# Patient Record
Sex: MALE | Race: WHITE | Employment: PART TIME | URBAN - METROPOLITAN AREA
[De-identification: names, ages, dates, MRNs, and addresses within clinical notes are randomized per-mention and may not be internally consistent; named-entity substitution may affect disease eponyms.]

---

## 2017-01-05 ENCOUNTER — OFFICE VISIT (OUTPATIENT)
Dept: FAMILY MEDICINE CLINIC | Age: 22
End: 2017-01-05

## 2017-01-05 VITALS
DIASTOLIC BLOOD PRESSURE: 82 MMHG | HEART RATE: 88 BPM | SYSTOLIC BLOOD PRESSURE: 138 MMHG | TEMPERATURE: 97.4 F | WEIGHT: 218 LBS | RESPIRATION RATE: 20 BRPM | HEIGHT: 69 IN | BODY MASS INDEX: 32.29 KG/M2

## 2017-01-05 DIAGNOSIS — R09.89 LYMPH NODE SYMPTOM: Primary | ICD-10-CM

## 2017-01-05 PROCEDURE — 99213 OFFICE O/P EST LOW 20 MIN: CPT | Performed by: FAMILY MEDICINE

## 2017-01-05 ASSESSMENT — PATIENT HEALTH QUESTIONNAIRE - PHQ9
1. LITTLE INTEREST OR PLEASURE IN DOING THINGS: 0
2. FEELING DOWN, DEPRESSED OR HOPELESS: 0
SUM OF ALL RESPONSES TO PHQ QUESTIONS 1-9: 0
SUM OF ALL RESPONSES TO PHQ9 QUESTIONS 1 & 2: 0

## 2017-01-05 ASSESSMENT — ENCOUNTER SYMPTOMS
SINUS PRESSURE: 0
COUGH: 0
ABDOMINAL PAIN: 0
SHORTNESS OF BREATH: 0
CONSTIPATION: 0
DIARRHEA: 0
EYE ITCHING: 0
EYE DISCHARGE: 0
SORE THROAT: 0

## 2017-08-08 ENCOUNTER — OFFICE VISIT (OUTPATIENT)
Dept: FAMILY MEDICINE CLINIC | Age: 22
End: 2017-08-08

## 2017-08-08 VITALS
WEIGHT: 209 LBS | HEART RATE: 88 BPM | RESPIRATION RATE: 18 BRPM | BODY MASS INDEX: 30.96 KG/M2 | TEMPERATURE: 97.9 F | HEIGHT: 69 IN | DIASTOLIC BLOOD PRESSURE: 76 MMHG | SYSTOLIC BLOOD PRESSURE: 122 MMHG

## 2017-08-08 DIAGNOSIS — Z01.89 ROUTINE LAB DRAW: Primary | ICD-10-CM

## 2017-08-08 PROCEDURE — 99395 PREV VISIT EST AGE 18-39: CPT | Performed by: FAMILY MEDICINE

## 2017-08-08 ASSESSMENT — ENCOUNTER SYMPTOMS
SHORTNESS OF BREATH: 0
DIARRHEA: 0
WHEEZING: 0
RHINORRHEA: 0
SINUS PRESSURE: 0
CONSTIPATION: 0
COUGH: 0
SORE THROAT: 1
BACK PAIN: 0
CHEST TIGHTNESS: 0
ABDOMINAL PAIN: 0

## 2019-03-19 ENCOUNTER — TELEPHONE (OUTPATIENT)
Dept: OTHER | Facility: CLINIC | Age: 24
End: 2019-03-19

## 2024-03-06 ASSESSMENT — PROMIS GLOBAL HEALTH SCALE
RATE_MENTAL_HEALTH: VERY GOOD
RATE_QUALITY_OF_LIFE: VERY GOOD
RATE_PHYSICAL_HEALTH: EXCELLENT
RATE_AVERAGE_PAIN: 2
CARRYOUT_PHYSICAL_ACTIVITIES: COMPLETELY
EMOTIONAL_PROBLEMS: SOMETIMES
RATE_GENERAL_HEALTH: VERY GOOD
CARRYOUT_SOCIAL_ACTIVITIES: VERY GOOD
RATE_AVERAGE_FATIGUE: MILD
RATE_SOCIAL_SATISFACTION: VERY GOOD

## 2024-03-06 NOTE — PROGRESS NOTES
"Subjective   Patient ID: Dutch Richardson is a 28 y.o. male who presents for testicular discomfort, Annual Exam, and Facial Swelling.  HPI    Patient presents in office today for an Annual physical. Last eye exam June 2022, last dental exam 2023. No new family h/o of cancer or heart disease. Does not need paperwork filled out today.     Testicular discomfort   States 15 - 16 years ago testicular torsions  Had surgery to correct this   Right side testicular swelling   Admits right has always been bigger than the left   Admits to being uncomfortable constantly   Current episode ongoing one week   Denies pain during sexual intercourse     Right sided facial swelling   Ongoing x 3 weeks   \"Weird feeling\" right side of tongue   Feels better today     Review of Systems  Constitutional:  no chills, no fever and no night sweats.  Eyes: no blurred vision and no eyesight problems.  ENT: no hearing loss, no nasal congestion, no hoarseness and no sore throat.  Neck: no mass (es) and no swelling.  Cardiovascular: no chest pain, no intermittent leg claudication, no lower extremity edema, no palpitation and no syncope.  Respiratory: no cough, no shortness of breath during exertion, no shortness of breath at rest and no wheezing.  Gastrointestinal: no abdominal pain, no blood in stools, no constipation, no diarrhea, no melena, no nausea, no rectal pain and no vomiting.  Genitourinary: no dysuria, no change in urinary frequency, no urinary hesitancy and no feelings of urinary urgency.  Musculoskeletal: no arthralgias, no back pain and no myalgias.  Integumentary: no new skin lesions and no rashes.  Neurological: no difficulty walking, no headache, no limb weakness, no numbness and no tingling.  Psychiatric/Behavioral: no anxiety, no depression, no anhedonia and no substance use disorders.  Endocrine: no recent weight gain and no recent weight loss.  Hematologic/Lymphatic: no tendency for easy bruising and no swollen glands    Objective " "  Physical Exam  Patient in for annual exam complaints of some right-sided fullness and throat feels like there is something swollen did not hurt to swallow no fever no chills.  Also right-sided testicular pain which is going on for about 2 to 3 weeks.  No trauma he is a  states that about age 11 he had testicular torsion and on the right side and had surgery bilaterally to fix that he has not had a problem since.  No increased physical activity as far as lifting bending he does workout and lift weights on a regular basis but he is not changed his workouts.  Well-developed well-nourished muscular male in no acute distress he has mild enlargement of the tonsil on the right side there is no adenopathy no sinus tenderness neck is supple lungs are clear no wheeze rhonchi crackles or retractions cardiac and abdominal exams benign he has no inguinal adenopathy he has left testicle is normal right testicle is fine no mass nontender right epididymis is swollen and very tender with mild palpation.  He has no musculoskeletal complaints no peripheral edema.  /56   Pulse 80   Temp 35.8 °C (96.5 °F) (Temporal)   Resp 18   Ht 1.727 m (5' 8\")   Wt 90.3 kg (199 lb)   SpO2 97%   BMI 30.26 kg/m²     No results found for: \"WBC\", \"HGB\", \"HCT\", \"MCV\", \"PLT\"    Assessment/Plan assessments epididymitis possible tonsillitis plan is to put him on Cipro 500 twice daily for a month he will follow-up with me in 2 weeks and will get blood drawn in the next few days we will follow-up when we have those results.  If the epididymis does not settle down or new symptoms develop we may need to get ultrasound of the scrotum or refer to urology for further workup depending on how he does over the next 2 weeks.  Problem List Items Addressed This Visit    None  Visit Diagnoses       Testicular discomfort    -  Primary    Relevant Medications    ciprofloxacin (Cipro) 500 mg tablet    Healthcare maintenance        Relevant Orders "    CBC and Auto Differential    Comprehensive Metabolic Panel    Lipid Panel    Urinalysis with Reflex Microscopic    Annual physical exam        Facial swelling        Epididymitis        Relevant Medications    ciprofloxacin (Cipro) 500 mg tablet

## 2024-03-07 ENCOUNTER — OFFICE VISIT (OUTPATIENT)
Dept: PRIMARY CARE | Facility: CLINIC | Age: 29
End: 2024-03-07
Payer: COMMERCIAL

## 2024-03-07 VITALS
OXYGEN SATURATION: 97 % | RESPIRATION RATE: 18 BRPM | TEMPERATURE: 96.5 F | HEIGHT: 68 IN | HEART RATE: 80 BPM | WEIGHT: 199 LBS | DIASTOLIC BLOOD PRESSURE: 56 MMHG | SYSTOLIC BLOOD PRESSURE: 112 MMHG | BODY MASS INDEX: 30.16 KG/M2

## 2024-03-07 DIAGNOSIS — Z00.00 HEALTHCARE MAINTENANCE: ICD-10-CM

## 2024-03-07 DIAGNOSIS — Z00.00 ANNUAL PHYSICAL EXAM: ICD-10-CM

## 2024-03-07 DIAGNOSIS — N45.1 EPIDIDYMITIS: ICD-10-CM

## 2024-03-07 DIAGNOSIS — N50.819 TESTICULAR DISCOMFORT: Primary | ICD-10-CM

## 2024-03-07 DIAGNOSIS — R22.0 FACIAL SWELLING: ICD-10-CM

## 2024-03-07 PROCEDURE — 99395 PREV VISIT EST AGE 18-39: CPT | Performed by: FAMILY MEDICINE

## 2024-03-07 RX ORDER — CIPROFLOXACIN 500 MG/1
500 TABLET ORAL 2 TIMES DAILY
Qty: 60 TABLET | Refills: 1 | Status: SHIPPED | OUTPATIENT
Start: 2024-03-07 | End: 2024-04-06

## 2024-03-08 ENCOUNTER — TELEPHONE (OUTPATIENT)
Dept: PRIMARY CARE | Facility: CLINIC | Age: 29
End: 2024-03-08

## 2024-03-08 ENCOUNTER — LAB (OUTPATIENT)
Dept: LAB | Facility: LAB | Age: 29
End: 2024-03-08
Payer: COMMERCIAL

## 2024-03-08 DIAGNOSIS — Z00.00 HEALTHCARE MAINTENANCE: ICD-10-CM

## 2024-03-08 LAB
ALBUMIN SERPL BCP-MCNC: 4 G/DL (ref 3.4–5)
ALP SERPL-CCNC: 60 U/L (ref 33–120)
ALT SERPL W P-5'-P-CCNC: 17 U/L (ref 10–52)
ANION GAP SERPL CALC-SCNC: 10 MMOL/L (ref 10–20)
APPEARANCE UR: CLEAR
AST SERPL W P-5'-P-CCNC: 14 U/L (ref 9–39)
BASOPHILS # BLD AUTO: 0.08 X10*3/UL (ref 0–0.1)
BASOPHILS NFR BLD AUTO: 0.8 %
BILIRUB SERPL-MCNC: 0.9 MG/DL (ref 0–1.2)
BILIRUB UR STRIP.AUTO-MCNC: NEGATIVE MG/DL
BUN SERPL-MCNC: 19 MG/DL (ref 6–23)
CALCIUM SERPL-MCNC: 9.2 MG/DL (ref 8.6–10.3)
CHLORIDE SERPL-SCNC: 104 MMOL/L (ref 98–107)
CHOLEST SERPL-MCNC: 135 MG/DL (ref 0–199)
CHOLESTEROL/HDL RATIO: 2.9
CO2 SERPL-SCNC: 28 MMOL/L (ref 21–32)
COLOR UR: YELLOW
CREAT SERPL-MCNC: 0.89 MG/DL (ref 0.5–1.3)
EGFRCR SERPLBLD CKD-EPI 2021: >90 ML/MIN/1.73M*2
EOSINOPHIL # BLD AUTO: 0.42 X10*3/UL (ref 0–0.7)
EOSINOPHIL NFR BLD AUTO: 4.4 %
ERYTHROCYTE [DISTWIDTH] IN BLOOD BY AUTOMATED COUNT: 12.7 % (ref 11.5–14.5)
GLUCOSE SERPL-MCNC: 89 MG/DL (ref 74–99)
GLUCOSE UR STRIP.AUTO-MCNC: NEGATIVE MG/DL
HCT VFR BLD AUTO: 42.7 % (ref 41–52)
HDLC SERPL-MCNC: 46.1 MG/DL
HGB BLD-MCNC: 14.7 G/DL (ref 13.5–17.5)
IMM GRANULOCYTES # BLD AUTO: 0.02 X10*3/UL (ref 0–0.7)
IMM GRANULOCYTES NFR BLD AUTO: 0.2 % (ref 0–0.9)
KETONES UR STRIP.AUTO-MCNC: NEGATIVE MG/DL
LDLC SERPL CALC-MCNC: 83 MG/DL
LEUKOCYTE ESTERASE UR QL STRIP.AUTO: NEGATIVE
LYMPHOCYTES # BLD AUTO: 4.82 X10*3/UL (ref 1.2–4.8)
LYMPHOCYTES NFR BLD AUTO: 50.2 %
MCH RBC QN AUTO: 30.2 PG (ref 26–34)
MCHC RBC AUTO-ENTMCNC: 34.4 G/DL (ref 32–36)
MCV RBC AUTO: 88 FL (ref 80–100)
MONOCYTES # BLD AUTO: 0.91 X10*3/UL (ref 0.1–1)
MONOCYTES NFR BLD AUTO: 9.5 %
NEUTROPHILS # BLD AUTO: 3.35 X10*3/UL (ref 1.2–7.7)
NEUTROPHILS NFR BLD AUTO: 34.9 %
NITRITE UR QL STRIP.AUTO: NEGATIVE
NON HDL CHOLESTEROL: 89 MG/DL (ref 0–149)
NRBC BLD-RTO: 0 /100 WBCS (ref 0–0)
PH UR STRIP.AUTO: 6 [PH]
PLATELET # BLD AUTO: 247 X10*3/UL (ref 150–450)
POTASSIUM SERPL-SCNC: 4.4 MMOL/L (ref 3.5–5.3)
PROT SERPL-MCNC: 6.5 G/DL (ref 6.4–8.2)
PROT UR STRIP.AUTO-MCNC: NEGATIVE MG/DL
RBC # BLD AUTO: 4.86 X10*6/UL (ref 4.5–5.9)
RBC # UR STRIP.AUTO: NEGATIVE /UL
SODIUM SERPL-SCNC: 138 MMOL/L (ref 136–145)
SP GR UR STRIP.AUTO: 1.02
TRIGL SERPL-MCNC: 32 MG/DL (ref 0–149)
UROBILINOGEN UR STRIP.AUTO-MCNC: <2 MG/DL
VLDL: 6 MG/DL (ref 0–40)
WBC # BLD AUTO: 9.6 X10*3/UL (ref 4.4–11.3)

## 2024-03-08 PROCEDURE — 80053 COMPREHEN METABOLIC PANEL: CPT

## 2024-03-08 PROCEDURE — 85025 COMPLETE CBC W/AUTO DIFF WBC: CPT

## 2024-03-08 PROCEDURE — 81003 URINALYSIS AUTO W/O SCOPE: CPT

## 2024-03-08 PROCEDURE — 36415 COLL VENOUS BLD VENIPUNCTURE: CPT

## 2024-03-08 PROCEDURE — 80061 LIPID PANEL: CPT

## 2024-03-08 NOTE — TELEPHONE ENCOUNTER
----- Message from Oscar Marroquin MD sent at 3/8/2024  1:37 PM EST -----  All labs are normal.  Repeat in a year

## 2024-03-22 ENCOUNTER — TELEPHONE (OUTPATIENT)
Dept: PRIMARY CARE | Facility: CLINIC | Age: 29
End: 2024-03-22
Payer: COMMERCIAL

## 2024-03-22 NOTE — TELEPHONE ENCOUNTER
FYI MESSAGE    Patient called and says that ciprofloxacin (Cipro) 500 mg tablet has been working; his symptoms have gone done. 100% gone but he is improving

## 2024-04-02 ENCOUNTER — TELEPHONE (OUTPATIENT)
Dept: PRIMARY CARE | Facility: CLINIC | Age: 29
End: 2024-04-02
Payer: COMMERCIAL

## 2024-04-02 DIAGNOSIS — N50.819 TESTICULAR DISCOMFORT: ICD-10-CM

## 2024-04-02 NOTE — TELEPHONE ENCOUNTER
HILARIA Marroquin,      After the first two weeks I noticed improvement in the pain and discomfort. As of today, April 2, 2024, I am still experiencing some discomfort. The pain increases or decreases based on activity level, type of underwear worn, or how active I’ve been at work. It still feels pretty sensitive to the touch.      I have 4 more days of Cirpro doses left. I am wondering what you believe we should do next? Thanks.     Dutch Richardson

## 2024-04-02 NOTE — TELEPHONE ENCOUNTER
----- Message from Dutch Richardson sent at 4/2/2024 11:47 AM EDT -----  Regarding: Following up from previous visit   Contact: [phone]  Dr. Marroquin,     After the first two weeks I noticed improvement in the pain and discomfort. As of today, April 2, 2024, I am still experiencing some discomfort. The pain increases or decreases based on activity level, type of underwear worn, or how active I’ve been at work. It still feels pretty sensitive to the touch.     I have 4 more days of Cirpro doses left. I am wondering what you believe we should do next? Thanks.    Dutch Richardson

## 2024-04-26 ENCOUNTER — OFFICE VISIT (OUTPATIENT)
Dept: UROLOGY | Facility: CLINIC | Age: 29
End: 2024-04-26
Payer: COMMERCIAL

## 2024-04-26 VITALS — HEART RATE: 68 BPM | TEMPERATURE: 97.1 F | SYSTOLIC BLOOD PRESSURE: 138 MMHG | DIASTOLIC BLOOD PRESSURE: 75 MMHG

## 2024-04-26 DIAGNOSIS — N50.819 TESTICULAR DISCOMFORT: ICD-10-CM

## 2024-04-26 PROCEDURE — 99204 OFFICE O/P NEW MOD 45 MIN: CPT | Performed by: NURSE PRACTITIONER

## 2024-04-26 PROCEDURE — 1036F TOBACCO NON-USER: CPT | Performed by: NURSE PRACTITIONER

## 2024-04-26 RX ORDER — MELOXICAM 15 MG/1
15 TABLET ORAL DAILY
Qty: 30 TABLET | Refills: 1 | Status: SHIPPED | OUTPATIENT
Start: 2024-04-26 | End: 2024-06-25

## 2024-04-26 NOTE — PROGRESS NOTES
Subjective   Patient ID: Dutch Richardson is a 28 y.o. male who presents for Testicle Pain (Right Testicle ).  Presents for eval of right testicular pain which began in early March 2024. Saw Dr. Marroquin and treated with Cipro for 1 month. Felt that symptoms were better after 2 weeks. Went for a run with dramatic exacerbation of pain. Completed full course of antibiotics without any benefit from baseline. States he has been off of antibiotics since end of March.     States pain has been intermittent for the last few weeks. Typically occurs all day long. Often has pain with water during shower. Variable pain especially if its cold as well as pants.     Previously drinking 2+ gallons of water daily, has decreased intake with some benefit. Also tenderness at the base. Seen for eval with unremarkable ultrasound. Symptoms resolved.     Works as a  in [place]. Recently lost 30 lbs.     Remote history of testicular torsion with surgical intervention in 2009.     Testicle Pain  The patient's primary symptoms include testicular pain.       Review of Systems   Genitourinary:  Positive for testicular pain.       Objective   Physical Exam  Vitals reviewed.   Genitourinary:     Penis: Normal and circumcised.       Testes: Normal.      Epididymis:      Right: Tenderness present.      Left: Normal.       Constitutional: Patient generally appears stated age. Good nutrition. No deformities. Good attention to grooming.  Neck: No neck masses. Good symmetry. No crepitus. Normal thyroid size and consistency with no masses.  Respiratory: Normal respiratory effort. No intercostal retractions. No use of accessory muscles.  Cardiovascular: Examination of the peripheral vascular system reveals no swelling or varicosities with good pulses. Normal extremity temperature, no edema or tenderness.  Abdomen: Examination of the abdomen reveals no masses or tenderness. No hernias detected. Normal liver and spleen size.   Lymphatic: No  palpable lymph nodes in the neck, axilla, groin or other locations  Skin: Inspection of the skin reveals no rashes, lesions, ulcers.  Neurologic/Psychiatric: Oriented to time, place and person. Normal mood and affect with no depression, anxiety, or agitation.      Assessment/Plan     Trial meloxicam daily for 1-2 months for management. Will eval with scrotal ultrasound. Follow up in a few months.          ANNELISE Simpson-CNP 04/26/24 9:49 AM

## 2024-04-30 ENCOUNTER — HOSPITAL ENCOUNTER (OUTPATIENT)
Dept: RADIOLOGY | Facility: CLINIC | Age: 29
Discharge: HOME | End: 2024-04-30
Payer: COMMERCIAL

## 2024-04-30 DIAGNOSIS — N50.819 TESTICULAR DISCOMFORT: ICD-10-CM

## 2024-04-30 PROCEDURE — 76870 US EXAM SCROTUM: CPT | Performed by: RADIOLOGY

## 2024-04-30 PROCEDURE — 76870 US EXAM SCROTUM: CPT

## 2024-05-05 ENCOUNTER — APPOINTMENT (OUTPATIENT)
Dept: RADIOLOGY | Facility: HOSPITAL | Age: 29
End: 2024-05-05
Payer: COMMERCIAL

## 2024-05-05 ENCOUNTER — HOSPITAL ENCOUNTER (EMERGENCY)
Facility: HOSPITAL | Age: 29
Discharge: HOME | End: 2024-05-05
Attending: EMERGENCY MEDICINE
Payer: COMMERCIAL

## 2024-05-05 VITALS
HEIGHT: 68 IN | RESPIRATION RATE: 18 BRPM | DIASTOLIC BLOOD PRESSURE: 58 MMHG | BODY MASS INDEX: 31.17 KG/M2 | OXYGEN SATURATION: 98 % | WEIGHT: 205.69 LBS | SYSTOLIC BLOOD PRESSURE: 121 MMHG | HEART RATE: 68 BPM | TEMPERATURE: 97.3 F

## 2024-05-05 DIAGNOSIS — T14.8XXA MUSCLE STRAIN: Primary | ICD-10-CM

## 2024-05-05 DIAGNOSIS — M54.50 ACUTE BILATERAL LOW BACK PAIN WITHOUT SCIATICA: ICD-10-CM

## 2024-05-05 PROCEDURE — 96372 THER/PROPH/DIAG INJ SC/IM: CPT | Performed by: EMERGENCY MEDICINE

## 2024-05-05 PROCEDURE — 2500000004 HC RX 250 GENERAL PHARMACY W/ HCPCS (ALT 636 FOR OP/ED)

## 2024-05-05 PROCEDURE — 99284 EMERGENCY DEPT VISIT MOD MDM: CPT | Mod: 25

## 2024-05-05 PROCEDURE — 2500000005 HC RX 250 GENERAL PHARMACY W/O HCPCS: Performed by: EMERGENCY MEDICINE

## 2024-05-05 PROCEDURE — 99285 EMERGENCY DEPT VISIT HI MDM: CPT | Performed by: EMERGENCY MEDICINE

## 2024-05-05 PROCEDURE — 72131 CT LUMBAR SPINE W/O DYE: CPT

## 2024-05-05 PROCEDURE — 96372 THER/PROPH/DIAG INJ SC/IM: CPT

## 2024-05-05 PROCEDURE — 2500000004 HC RX 250 GENERAL PHARMACY W/ HCPCS (ALT 636 FOR OP/ED): Performed by: EMERGENCY MEDICINE

## 2024-05-05 PROCEDURE — 72131 CT LUMBAR SPINE W/O DYE: CPT | Mod: FOREIGN READ | Performed by: RADIOLOGY

## 2024-05-05 RX ORDER — CYCLOBENZAPRINE HCL 10 MG
5 TABLET ORAL 3 TIMES DAILY PRN
Qty: 6 TABLET | Refills: 0 | Status: SHIPPED | OUTPATIENT
Start: 2024-05-05 | End: 2024-05-09

## 2024-05-05 RX ORDER — HYDROMORPHONE HYDROCHLORIDE 1 MG/ML
1 INJECTION, SOLUTION INTRAMUSCULAR; INTRAVENOUS; SUBCUTANEOUS ONCE
Status: COMPLETED | OUTPATIENT
Start: 2024-05-05 | End: 2024-05-05

## 2024-05-05 RX ORDER — OXYCODONE HYDROCHLORIDE 5 MG/1
5 TABLET ORAL EVERY 6 HOURS PRN
Qty: 9 TABLET | Refills: 0 | Status: SHIPPED | OUTPATIENT
Start: 2024-05-05 | End: 2024-05-08

## 2024-05-05 RX ORDER — LIDOCAINE 560 MG/1
1 PATCH PERCUTANEOUS; TOPICAL; TRANSDERMAL DAILY
Qty: 7 PATCH | Refills: 0 | Status: SHIPPED | OUTPATIENT
Start: 2024-05-05 | End: 2024-05-12

## 2024-05-05 RX ORDER — LIDOCAINE 560 MG/1
1 PATCH PERCUTANEOUS; TOPICAL; TRANSDERMAL DAILY
Status: DISCONTINUED | OUTPATIENT
Start: 2024-05-05 | End: 2024-05-05 | Stop reason: HOSPADM

## 2024-05-05 RX ORDER — ORPHENADRINE CITRATE 30 MG/ML
60 INJECTION INTRAMUSCULAR; INTRAVENOUS ONCE
Status: COMPLETED | OUTPATIENT
Start: 2024-05-05 | End: 2024-05-05

## 2024-05-05 RX ORDER — HYDROMORPHONE HYDROCHLORIDE 1 MG/ML
INJECTION, SOLUTION INTRAMUSCULAR; INTRAVENOUS; SUBCUTANEOUS
Status: COMPLETED
Start: 2024-05-05 | End: 2024-05-05

## 2024-05-05 RX ADMIN — LIDOCAINE 4% 1 PATCH: 40 PATCH TOPICAL at 11:12

## 2024-05-05 RX ADMIN — HYDROMORPHONE HYDROCHLORIDE 1 MG: 1 INJECTION, SOLUTION INTRAMUSCULAR; INTRAVENOUS; SUBCUTANEOUS at 13:40

## 2024-05-05 RX ADMIN — ORPHENADRINE CITRATE 60 MG: 60 INJECTION INTRAMUSCULAR; INTRAVENOUS at 11:09

## 2024-05-05 RX ADMIN — HYDROMORPHONE HYDROCHLORIDE 1 MG: 1 INJECTION, SOLUTION INTRAMUSCULAR; INTRAVENOUS; SUBCUTANEOUS at 11:35

## 2024-05-05 ASSESSMENT — PAIN DESCRIPTION - ORIENTATION: ORIENTATION: LOWER

## 2024-05-05 ASSESSMENT — PAIN DESCRIPTION - PAIN TYPE: TYPE: ACUTE PAIN

## 2024-05-05 ASSESSMENT — PAIN - FUNCTIONAL ASSESSMENT
PAIN_FUNCTIONAL_ASSESSMENT: 0-10

## 2024-05-05 ASSESSMENT — PAIN DESCRIPTION - LOCATION: LOCATION: BACK

## 2024-05-05 ASSESSMENT — COLUMBIA-SUICIDE SEVERITY RATING SCALE - C-SSRS
6. HAVE YOU EVER DONE ANYTHING, STARTED TO DO ANYTHING, OR PREPARED TO DO ANYTHING TO END YOUR LIFE?: NO
1. IN THE PAST MONTH, HAVE YOU WISHED YOU WERE DEAD OR WISHED YOU COULD GO TO SLEEP AND NOT WAKE UP?: NO
2. HAVE YOU ACTUALLY HAD ANY THOUGHTS OF KILLING YOURSELF?: NO

## 2024-05-05 ASSESSMENT — LIFESTYLE VARIABLES
EVER HAD A DRINK FIRST THING IN THE MORNING TO STEADY YOUR NERVES TO GET RID OF A HANGOVER: NO
HAVE YOU EVER FELT YOU SHOULD CUT DOWN ON YOUR DRINKING: NO
HAVE PEOPLE ANNOYED YOU BY CRITICIZING YOUR DRINKING: NO
TOTAL SCORE: 0
EVER FELT BAD OR GUILTY ABOUT YOUR DRINKING: NO

## 2024-05-05 ASSESSMENT — PAIN SCALES - GENERAL
PAINLEVEL_OUTOF10: 10 - WORST POSSIBLE PAIN
PAINLEVEL_OUTOF10: 8
PAINLEVEL_OUTOF10: 7
PAINLEVEL_OUTOF10: 7
PAINLEVEL_OUTOF10: 9

## 2024-05-05 NOTE — ED PROVIDER NOTES
EMERGENCY DEPARTMENT ENCOUNTER      Pt Name: Dutch Richardson  MRN: [id number]  Birthdate 1995  Date of evaluation: 5/5/2024    HISTORY OF PRESENT ILLNESS    Dutch Richardson is an 28 y.o. male with history including Epididymitis on meloxicam presenting to the emergency department for lower back pain.  Patient states that he was doing dead lifts earlier today.  He was able to complete his workout and then developed back pain after he bent over to remove something from the bar.  Pain is on both sides of his back.  He denies any numbness in his lower extremities or saddle anesthesia.  Patient denies any tingling or loss of motor function.  Pain is worsened by certain movements and feels better when he is sitting still or not twisting.  He has not taken any medications in regards for this pain.  He has had something similar to this a few years ago but did not go to the ED and did not take any medications and it resolved on its own.    PAST MEDICAL HISTORY     Past Medical History:   Diagnosis Date    Allergic        SURGICAL HISTORY       Past Surgical History:   Procedure Laterality Date    ADENOIDECTOMY      HERNIA REPAIR         CURRENT MEDICATIONS       Previous Medications    MELOXICAM (MOBIC) 15 MG TABLET    Take 1 tablet (15 mg) by mouth once daily.       ALLERGIES     Patient has no known allergies.    FAMILY HISTORY       Family History   Problem Relation Name Age of Onset    Breast cancer Mother Mandi Richardson         SOCIAL HISTORY       Social History     Socioeconomic History    Marital status:      Spouse name: None    Number of children: None    Years of education: None    Highest education level: None   Occupational History    None   Tobacco Use    Smoking status: Never    Smokeless tobacco: Former     Types: Snuff     Quit date: 7/2/2020   Substance and Sexual Activity    Alcohol use: Never    Drug use: Never    Sexual activity: Yes     Partners: Female     Birth control/protection: I.U.D.     Comment: Wife    Other Topics Concern    None   Social History Narrative    None     Social Determinants of Health     Financial Resource Strain: Low Risk  (12/15/2023)    Received from [employer]    Overall Financial Resource Strain (CARDIA)     Difficulty of Paying Living Expenses: Not very hard   Food Insecurity: No Food Insecurity (12/15/2023)    Received from [employer]    Hunger Vital Sign     Worried About Running Out of Food in the Last Year: Never true     Ran Out of Food in the Last Year: Never true   Transportation Needs: No Transportation Needs (12/15/2023)    Received from [employer]    PRAPARE - Transportation     Lack of Transportation (Medical): No     Lack of Transportation (Non-Medical): No   Physical Activity: Sufficiently Active (12/15/2023)    Received from [employer]    Exercise Vital Sign     Days of Exercise per Week: 7 days     Minutes of Exercise per Session: 60 min   Stress: No Stress Concern Present (12/15/2023)    Received from [employer]    [place] [employer] - Occupational Stress Questionnaire     Feeling of Stress : Not at all   Social Connections: Moderately Isolated (12/15/2023)    Received from [employer]    Social Connection and Isolation Panel [NHANES]     Frequency of Communication with Friends and Family: Twice a week     Frequency of Social Gatherings with Friends and Family: Once a week     Attends Buddhist Services: Never     Active Member of Clubs or Organizations: No     Attends Club or Organization Meetings: Never     Marital Status:    Intimate Partner Violence: Not At Risk (12/15/2023)    Received from [employer]    Humiliation, Afraid, Rape, and Kick questionnaire     Fear of Current or Ex-Partner: No     Emotionally Abused: No     Physically Abused: No     Sexually Abused: No   Housing Stability: Low Risk  (12/15/2023)    Received from [employer]    Housing Stability Vital Sign     Unable to Pay for Housing in the Last Year: No     Number of  Places Lived in the Last Year: 1     Unstable Housing in the Last Year: No       PHYSICAL EXAM       ED Triage Vitals [05/05/24 1002]   Temperature Heart Rate Respirations BP   36.1 °C (97 °F) 72 16 134/76      Pulse Ox Temp Source Heart Rate Source Patient Position   99 % Temporal Monitor Sitting      BP Location FiO2 (%)     Right arm --       Physical Exam  Vitals and nursing note reviewed.   Constitutional:       General: He is not in acute distress.     Appearance: He is well-developed.   HENT:      Head: Normocephalic and atraumatic.   Eyes:      Conjunctiva/sclera: Conjunctivae normal.   Cardiovascular:      Rate and Rhythm: Normal rate and regular rhythm.      Heart sounds: No murmur heard.  Pulmonary:      Effort: Pulmonary effort is normal. No respiratory distress.      Breath sounds: Normal breath sounds.   Musculoskeletal:      Cervical back: Normal and neck supple.      Thoracic back: Normal.      Lumbar back: No tenderness or bony tenderness. Decreased range of motion (Pain with movement). Negative right straight leg raise test and negative left straight leg raise test. No scoliosis.   Skin:     General: Skin is warm and dry.      Capillary Refill: Capillary refill takes less than 2 seconds.   Neurological:      Mental Status: He is alert.   Psychiatric:         Mood and Affect: Mood normal.          DIAGNOSTIC RESULTS     LABS:  Labs Reviewed - No data to display    All other labs were within normal range or not returned as of this dictation.    Imaging  CT lumbar spine wo IV contrast   Final Result   No acute fracture or traumatic subluxation.   Signed by Dave Obrien MD           Procedures  Procedures     EMERGENCY DEPARTMENT COURSE/MDM:   Medical Decision Making  Dutch Richardson is an 28 y.o. male with history including Epididymitis on meloxicam presenting to the emergency department for lower back pain.  Patient has no red flag symptoms.  Pain is reproducible with movement.  Most likely a muscle  strain or SI joint irritation from the bending and twisting motion.  Patient given a dose of Norflex IM here and his father will drive him home.  He was given a short prescription of muscle relaxants for pain relief as patient already takes meloxicam no need for additional NSAIDs.  Patient was given strict return precautions and what to look for for red flag symptoms.    At time of discharge patient had severe pain not improved with Norflex given IM 1 mg Dilaudid. he was still having severe pain and could not move CT imaging was at this time.  Reviewed the CT imaging shows no signs of subluxation or traumatic injury.  Patient was given a second dose of Dilaudid while waiting for imaging.  He will be discharged adding on pain medication with muscle relaxants and lidocaine patches.        Diagnoses as of 05/05/24 1424   Muscle strain   Acute bilateral low back pain without sciatica        External records reviewed: recent inpatient, clinic, and prior ED notes  Labs and Diagnostic imaging independently reviewed/interpreted by me.    Patient plan, care, lab results and imaging were all discussed with attending.    ED Medications administered this visit:    Medications   lidocaine 4 % patch 1 patch (1 patch transdermal Medication Applied 5/5/24 1112)   orphenadrine (Norflex) injection 60 mg (60 mg intramuscular Given 5/5/24 1109)   HYDROmorphone (Dilaudid) injection 1 mg (1 mg intramuscular Given 5/5/24 1135)   HYDROmorphone (Dilaudid) injection 1 mg (1 mg intramuscular Given 5/5/24 1340)     New Prescriptions from this visit:    New Prescriptions    CYCLOBENZAPRINE (FLEXERIL) 10 MG TABLET    Take 0.5 tablets (5 mg) by mouth 3 times a day as needed for muscle spasms for up to 4 days.       (Please note that portions of this note were completed with a voice recognition program.  Efforts were made to edit the dictations but occasionally words are mis-transcribed.)     Cori Gilman,   Resident  05/05/24 1111        Cori Gilman,   Resident  05/05/24 1203       DO Erasto Garnica  05/05/24 4717

## 2024-05-05 NOTE — DISCHARGE INSTRUCTIONS
Seek immediate medical attention if you develop: increasing pain, numbness, tingling, weakness, loss of motion in your arms or legs, loss of control of your urine or stool, fever, abdominal pain, chest pain, shortness of breath, or any new or worsening symptoms.

## 2024-05-05 NOTE — Clinical Note
Dutch Richardson was seen and treated in our emergency department on 5/5/2024.  He may return to work on 05/09/2024.       If you have any questions or concerns, please don't hesitate to call.      Justen Blackwell, DO

## 2024-05-08 ASSESSMENT — ENCOUNTER SYMPTOMS
PERIANAL NUMBNESS: 0
LEG PAIN: 0
FEVER: 0
DYSURIA: 0
BOWEL INCONTINENCE: 0
ABDOMINAL PAIN: 0
PARESTHESIAS: 0
WEIGHT LOSS: 0
BACK PAIN: 1
PARESIS: 0
WEAKNESS: 0
HEADACHES: 0
NUMBNESS: 0
TINGLING: 0

## 2024-05-08 NOTE — PROGRESS NOTES
Subjective   Patient ID: Dutch Richardson is a 28 y.o. male who presents for Back Injury.  HPI    Patient presents in the office today for a follow up on an emergency room visit. Patient was seen at [facility] on 5/5/54 for a back injury. Patient states that he was doing dead lifts earlier in that day. He was able to complete his workout and then developed back pain after he bent over to remove something from the bar. Pain was on both sides of his back. He denies any numbness in his lower extremities. Patient denies any tingling. Pain is worsened by certain movements and feels better when he is sitting still or not twisting.   Patient had a CT scan done.   Patient was discharged home with pain medication, muscle relaxer's, and lidocaine patches.     Since being out of the hospital patient states he has limited range of motion. Patient states it is only specific movements that catch him. Patient now describes the pain as a tight sharp stabbing pain. Patient states this is not consistent only when he moves. Pain has gotten better.    Review of Systems  Constitutional:  no chills, no fever and no night sweats.  Eyes: no blurred vision and no eyesight problems.  ENT: no hearing loss, no nasal congestion, no hoarseness and no sore throat.  Neck: no mass (es) and no swelling.  Cardiovascular: no chest pain, no intermittent leg claudication, no lower extremity edema, no palpitation and no syncope.  Respiratory: no cough, no shortness of breath during exertion, no shortness of breath at rest and no wheezing.  Gastrointestinal: no abdominal pain, no blood in stools, no constipation, no diarrhea, no melena, no nausea, no rectal pain and no vomiting.  Genitourinary: no dysuria, no change in urinary frequency, no urinary hesitancy and no feelings of urinary urgency.  Musculoskeletal: no arthralgias, no back pain and no myalgias.  Integumentary: no new skin lesions and no rashes.  Neurological: no difficulty walking,  "no headache, no limb weakness, no numbness and no tingling.  Psychiatric/Behavioral: no anxiety, no depression, no anhedonia and no substance use disorders.  Endocrine: no recent weight gain and no recent weight loss.  Hematologic/Lymphatic: no tendency for easy bruising and no swollen glands    Objective   Physical Exam  Patient in for follow-up ER visit low back pain started after lifting weights and then exacerbated when he lifted weights again bent over sudden sudden pain in the lumbar spine paresthesias no sciatica.  Exam pain and tenderness in the lumbar spine mild SI joint pain worse pain with full extension of the spine.  No weakness in the muscles  /68 (BP Location: Left arm, Patient Position: Sitting)   Pulse 61   Temp 36.4 °C (97.5 °F) (Temporal)   Ht 1.727 m (5' 8\")   Wt 94.7 kg (208 lb 12.8 oz)   SpO2 97%   BMI 31.75 kg/m²     Lab Results   Component Value Date    WBC 9.6 03/08/2024    HGB 14.7 03/08/2024    HCT 42.7 03/08/2024    MCV 88 03/08/2024     03/08/2024       Assessment/Plan plan burst and taper prednisone and continue heat and ice keep him off work as he is a  and follow-up with him next week.  Problem List Items Addressed This Visit    None  Visit Diagnoses       Low back pain, unspecified back pain laterality, unspecified chronicity, unspecified whether sciatica present    -  Primary    BMI 31.0-31.9,adult        Class 1 obesity due to excess calories with serious comorbidity and body mass index (BMI) of 31.0 to 31.9 in adult              "

## 2024-05-09 ENCOUNTER — APPOINTMENT (OUTPATIENT)
Dept: UROLOGY | Facility: CLINIC | Age: 29
End: 2024-05-09
Payer: COMMERCIAL

## 2024-05-09 ENCOUNTER — TELEPHONE (OUTPATIENT)
Dept: PRIMARY CARE | Facility: CLINIC | Age: 29
End: 2024-05-09

## 2024-05-09 ENCOUNTER — OFFICE VISIT (OUTPATIENT)
Dept: PRIMARY CARE | Facility: CLINIC | Age: 29
End: 2024-05-09
Payer: COMMERCIAL

## 2024-05-09 VITALS
HEIGHT: 68 IN | HEART RATE: 61 BPM | DIASTOLIC BLOOD PRESSURE: 68 MMHG | OXYGEN SATURATION: 97 % | BODY MASS INDEX: 31.64 KG/M2 | TEMPERATURE: 97.5 F | WEIGHT: 208.8 LBS | SYSTOLIC BLOOD PRESSURE: 124 MMHG

## 2024-05-09 DIAGNOSIS — M54.50 LOW BACK PAIN, UNSPECIFIED BACK PAIN LATERALITY, UNSPECIFIED CHRONICITY, UNSPECIFIED WHETHER SCIATICA PRESENT: Primary | ICD-10-CM

## 2024-05-09 DIAGNOSIS — E66.09 CLASS 1 OBESITY DUE TO EXCESS CALORIES WITH SERIOUS COMORBIDITY AND BODY MASS INDEX (BMI) OF 31.0 TO 31.9 IN ADULT: ICD-10-CM

## 2024-05-09 PROCEDURE — 99213 OFFICE O/P EST LOW 20 MIN: CPT | Performed by: FAMILY MEDICINE

## 2024-05-09 PROCEDURE — 1036F TOBACCO NON-USER: CPT | Performed by: FAMILY MEDICINE

## 2024-05-09 PROCEDURE — 3008F BODY MASS INDEX DOCD: CPT | Performed by: FAMILY MEDICINE

## 2024-05-09 RX ORDER — PREDNISONE 10 MG/1
TABLET ORAL
Qty: 51 TABLET | Refills: 0 | Status: SHIPPED | OUTPATIENT
Start: 2024-05-09

## 2024-05-09 ASSESSMENT — PATIENT HEALTH QUESTIONNAIRE - PHQ9
2. FEELING DOWN, DEPRESSED OR HOPELESS: NOT AT ALL
1. LITTLE INTEREST OR PLEASURE IN DOING THINGS: NOT AT ALL
SUM OF ALL RESPONSES TO PHQ9 QUESTIONS 1 AND 2: 0

## 2024-05-09 NOTE — LETTER
May 9, 2024     Patient: Dutch Richardson   YOB: 1995   Date of Visit: 5/9/2024       To Whom It May Concern:    Dutch Richardson was seen in my clinic on 5/9/2024 at ?. Please excuse Dutch for his absence from work on Friday 5/10/24, Saturday 5/11/24 and Sunday 5/12/24.     If you have any questions or concerns, please don't hesitate to call.         Sincerely,         Oscar Marroquin MD

## 2024-05-14 ENCOUNTER — TELEPHONE (OUTPATIENT)
Dept: PRIMARY CARE | Facility: CLINIC | Age: 29
End: 2024-05-14
Payer: COMMERCIAL

## 2024-05-14 NOTE — TELEPHONE ENCOUNTER
Oscar Marroquin MD  Do [id number] minutes ago (3:59 PM)       Okay for note to return him to work on the 20th return to work without restriction

## 2024-05-14 NOTE — TELEPHONE ENCOUNTER
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 I was advised by my employer that I will need a note allowing me to return to work on Monday, May 20, 2024. Thank you!    Best,    Dutch Richardson

## 2024-05-14 NOTE — LETTER
May 14, 2024     Patient: Dutch Richardson   YOB: 1995   Date of Visit: 05/09/2024       To Whom It May Concern:    Dutch Richardson was seen in my office 05/09/2024. Please excuse patient from work 05/09/2024 to 05/19/2024. He may return to work 05/20/2024 with out restrictions.    If you have any questions or concerns, please don't hesitate to call.         Sincerely,         Dr. Oscar Marroquin MD

## 2024-06-28 ENCOUNTER — APPOINTMENT (OUTPATIENT)
Dept: UROLOGY | Facility: CLINIC | Age: 29
End: 2024-06-28
Payer: COMMERCIAL

## 2024-08-06 ENCOUNTER — APPOINTMENT (OUTPATIENT)
Dept: UROLOGY | Facility: CLINIC | Age: 29
End: 2024-08-06
Payer: COMMERCIAL

## 2024-08-06 VITALS
HEART RATE: 56 BPM | DIASTOLIC BLOOD PRESSURE: 79 MMHG | TEMPERATURE: 97.3 F | HEIGHT: 68 IN | SYSTOLIC BLOOD PRESSURE: 128 MMHG | BODY MASS INDEX: 31.52 KG/M2 | WEIGHT: 208 LBS

## 2024-08-06 DIAGNOSIS — N50.811 RIGHT TESTICULAR PAIN: Primary | ICD-10-CM

## 2024-08-06 PROCEDURE — G2211 COMPLEX E/M VISIT ADD ON: HCPCS | Performed by: NURSE PRACTITIONER

## 2024-08-06 PROCEDURE — 99214 OFFICE O/P EST MOD 30 MIN: CPT | Performed by: NURSE PRACTITIONER

## 2024-08-06 PROCEDURE — 3008F BODY MASS INDEX DOCD: CPT | Performed by: NURSE PRACTITIONER

## 2024-08-06 PROCEDURE — 1036F TOBACCO NON-USER: CPT | Performed by: NURSE PRACTITIONER

## 2024-08-06 NOTE — PROGRESS NOTES
Subjective   Patient ID: Dutch Richardson is a 29 y.o. male who presents for Testicle Pain.  Presents for follow up of testicular pain began in early March 2024. Saw Dr. Marroquin and treated with Cipro for 1 month. Felt that symptoms were better after 2 weeks. Went for a run with dramatic exacerbation of pain. Completed full course of antibiotics without any benefit from baseline. States he has been off of antibiotics since end of March.      States pain has been intermittent for the last few weeks. Typically occurs all day long. Often has pain with water during shower. Variable pain especially if its cold as well as pants. Pain seems to be common with running.      Previously drinking 2+ gallons of water daily, has decreased intake with some benefit. Also tenderness at the base. Seen for eval with unremarkable ultrasound. Symptoms resolved. Took meloxicam with some benefit, not fully resolved. Developed back pain requiring course of prednisone.      Works as a  in [place].      Remote history of testicular torsion with surgical intervention in 2009.           Review of Systems   All other systems reviewed and are negative.      Objective   Physical Exam  Vitals reviewed.   Genitourinary:     Penis: Normal and circumcised.       Testes: Normal.      Epididymis:      Right: Not inflamed. Tenderness present.      Left: Normal.     Alert and oriented x3  Moist mucous membranes  Breathes easily on room air  Abdomen soft, nondistended  No edema  No scleral icterus  No focal neurological deficits  Appears stated age, no acute distress    === 04/30/24 ===    US SCROTUM    - Impression -  1. Enlarged right epididymal tail which could represent epididymitis.  2. 0.8 cm benign right epididymal head cyst or spermatocele.  3. Right inguinal hernia containing fat    MACRO:  None    Signed by: Fe Lugo 5/1/2024 12:55 PM  Dictation workstation:   [id number]      Assessment/Plan   Diagnoses and all orders for  this visit:  Right testicular pain  -     Referral to Physical Therapy; Future    Persistent right testicular pain, minimal improvement with meloxicam. No response to 1 month course of Cipro. Plan for PFPT eval and follow up in a few months         ANNELISE Simpson-CNP 08/06/24 8:30 AM

## 2024-09-18 ENCOUNTER — OFFICE VISIT (OUTPATIENT)
Dept: PRIMARY CARE | Facility: CLINIC | Age: 29
End: 2024-09-18
Payer: COMMERCIAL

## 2024-09-18 VITALS
RESPIRATION RATE: 18 BRPM | OXYGEN SATURATION: 98 % | HEIGHT: 68 IN | DIASTOLIC BLOOD PRESSURE: 76 MMHG | BODY MASS INDEX: 32.58 KG/M2 | WEIGHT: 215 LBS | SYSTOLIC BLOOD PRESSURE: 128 MMHG | HEART RATE: 89 BPM

## 2024-09-18 DIAGNOSIS — M54.50 LOW BACK PAIN, UNSPECIFIED BACK PAIN LATERALITY, UNSPECIFIED CHRONICITY, UNSPECIFIED WHETHER SCIATICA PRESENT: ICD-10-CM

## 2024-09-18 DIAGNOSIS — L89.899: ICD-10-CM

## 2024-09-18 PROCEDURE — 3008F BODY MASS INDEX DOCD: CPT | Performed by: FAMILY MEDICINE

## 2024-09-18 PROCEDURE — 99213 OFFICE O/P EST LOW 20 MIN: CPT | Performed by: FAMILY MEDICINE

## 2024-09-18 RX ORDER — PREDNISONE 10 MG/1
TABLET ORAL
Qty: 51 TABLET | Refills: 0 | Status: SHIPPED | OUTPATIENT
Start: 2024-09-18

## 2024-09-18 ASSESSMENT — PATIENT HEALTH QUESTIONNAIRE - PHQ9
SUM OF ALL RESPONSES TO PHQ9 QUESTIONS 1 AND 2: 0
2. FEELING DOWN, DEPRESSED OR HOPELESS: NOT AT ALL
1. LITTLE INTEREST OR PLEASURE IN DOING THINGS: NOT AT ALL

## 2024-09-18 NOTE — PROGRESS NOTES
Subjective   Patient ID: Dutch Richardson is a 29 y.o. male who presents for neck pressure.  HPI    Patient presents in office today for neck pressure. Ongoing x 2-3 months. Denies pain in the neck or the ear. Denies any patient does have his top wisdom teeth. Patient admits to a history of strep and ear infections as a child. Unsure of any neck swelling. Admits that this is on the right side of his neck.     Review of Systems  Constitutional:  no chills, no fever and no night sweats.  Eyes: no blurred vision and no eyesight problems.  ENT: no hearing loss, no nasal congestion, no hoarseness and no sore throat.  Neck: no mass (es) and no swelling.  Cardiovascular: no chest pain, no intermittent leg claudication, no lower extremity edema, no palpitation and no syncope.  Respiratory: no cough, no shortness of breath during exertion, no shortness of breath at rest and no wheezing.  Gastrointestinal: no abdominal pain, no blood in stools, no constipation, no diarrhea, no melena, no nausea, no rectal pain and no vomiting.  Genitourinary: no dysuria, no change in urinary frequency, no urinary hesitancy and no feelings of urinary urgency.  Musculoskeletal: no arthralgias, no back pain and no myalgias.  Integumentary: no new skin lesions and no rashes.  Neurological: no difficulty walking, no headache, no limb weakness, no numbness and no tingling.  Psychiatric/Behavioral: no anxiety, no depression, no anhedonia and no substance use disorders.  Endocrine: no recent weight gain and no recent weight loss.  Hematologic/Lymphatic: no tendency for easy bruising and no swollen glands    Objective   Physical Exam  Patient with complaints of 2 to 3 months of right-sided neck pressure starts at the base of the jaw near the temporomandibular joint goes under the angle of the jaw and to the back neck no neck pain full range of motion no tooth pain no fall no injury no increased activity not lifting weights doing neck exercises etc.   "Well-developed well-nourished muscular male in no acute distress HEENT exam TMs are clear old scars from infections when he was a child no sinus pressure pain no tooth pain he does have some slight shifting in mild discomfort with palpation over the TMJ with full opening and closing of the jaw.  Lungs clear cardiac and abdominal exams are benign.  /76   Pulse 89   Resp 18   Ht 1.727 m (5' 8\")   Wt 97.5 kg (215 lb)   SpO2 98%   BMI 32.69 kg/m²     Lab Results   Component Value Date    WBC 9.6 03/08/2024    HGB 14.7 03/08/2024    HCT 42.7 03/08/2024    MCV 88 03/08/2024     03/08/2024       Assessment/Plan possible mild TMJ inflammation plan burst and taper prednisone follow-up in 2 weeks after he is finished with the medication may need referral for evaluation.  Denies locking and clicking of the jaw  Problem List Items Addressed This Visit    None  Visit Diagnoses       Pressure injury of back of neck              "

## 2024-09-30 ENCOUNTER — TELEPHONE (OUTPATIENT)
Dept: PRIMARY CARE | Facility: CLINIC | Age: 29
End: 2024-09-30
Payer: COMMERCIAL

## 2024-09-30 DIAGNOSIS — R68.84 JAW PAIN: ICD-10-CM

## 2024-09-30 DIAGNOSIS — H92.09 OTALGIA, UNSPECIFIED LATERALITY: ICD-10-CM

## 2024-09-30 NOTE — TELEPHONE ENCOUNTER
MK  Good morning,     I am writing to inform Dr. Marroquin that while taking the higher dosage (first 6 days) of the prednisone, the discomfort subsided. As the dosage decreased throughout the 11 days, the discomfort came back.      The discomfort is still located behind the right ear and under upper jawline.      Thanks,  Dutch Richardson

## 2024-09-30 NOTE — TELEPHONE ENCOUNTER
Oscar Marroquin MD  [id number]1 Clinical Support Staff2 hours ago (2:23 PM)       Refer him to ENT.

## 2024-10-15 DIAGNOSIS — M54.50 LOW BACK PAIN, UNSPECIFIED BACK PAIN LATERALITY, UNSPECIFIED CHRONICITY, UNSPECIFIED WHETHER SCIATICA PRESENT: ICD-10-CM

## 2024-10-15 RX ORDER — PREDNISONE 10 MG/1
TABLET ORAL
Qty: 51 TABLET | Refills: 0 | OUTPATIENT
Start: 2024-10-15

## 2024-11-04 ENCOUNTER — TELEPHONE (OUTPATIENT)
Dept: UROLOGY | Facility: CLINIC | Age: 29
End: 2024-11-04

## 2024-11-04 ENCOUNTER — APPOINTMENT (OUTPATIENT)
Dept: UROLOGY | Facility: CLINIC | Age: 29
End: 2024-11-04
Payer: COMMERCIAL

## 2024-11-04 DIAGNOSIS — N50.819 TESTICULAR DISCOMFORT: ICD-10-CM

## 2024-11-04 DIAGNOSIS — N50.811 RIGHT TESTICULAR PAIN: Primary | ICD-10-CM

## 2024-11-04 PROCEDURE — 99213 OFFICE O/P EST LOW 20 MIN: CPT | Performed by: NURSE PRACTITIONER

## 2024-11-04 NOTE — TELEPHONE ENCOUNTER
Left message for pt to call and schedule a 6 month follow up with Dr. Burrows.     ----- Message from Tracey Burrows sent at 11/4/2024  3:17 PM EST -----  6 month pfpt

## 2024-11-04 NOTE — PROGRESS NOTES
Subjective   Patient ID: Dutch Richardson is a 29 y.o. male who presents for Testicle Pain.  Presents for follow up of testicular pain began in early March 2024. Saw Dr. Marroquin and treated with Cipro for 1 month. Felt that symptoms were better after 2 weeks. Went for a run with dramatic exacerbation of pain. Completed full course of antibiotics without any benefit from baseline. States he has been off of antibiotics since end of March 2024. States pain has been intermittent. Typically occurs all day long. Often has pain with water during shower. Variable pain especially if its cold as well as pants. Pain seems to be common with running. Patient has been active with PFPT. Very pleased with improvement in symptoms. Almost none over the last several weeks. Incorporating exercises into his regular workouts.      Previously drinking 2+ gallons of water daily, has decreased intake with some benefit. Also tenderness at the base. Seen for eval with unremarkable ultrasound. Symptoms resolved. Took meloxicam with some benefit, not fully resolved. Developed back pain requiring course of prednisone.      Works as a  in [place].      Remote history of testicular torsion with surgical intervention in 2009.           Review of Systems   All other systems reviewed and are negative.      Objective   Physical Exam  ** UNABLE TO CONNECT VIA THV**    Assessment/Plan   Diagnoses and all orders for this visit:  Right testicular pain  Testicular discomfort    Significant improvement s/p PFPT. Will continue at this time. Plan for 6 month follow up or sooner if needed. Patient verbalized understanding of plan.          Tracey Burrows, ANNELISE-CNP 11/04/24 3:04 PM

## 2024-11-05 ASSESSMENT — ENCOUNTER SYMPTOMS
DIARRHEA: 0
HEADACHES: 0
ABDOMINAL PAIN: 0
SORE THROAT: 0
RHINORRHEA: 0
COUGH: 0
NECK PAIN: 0
VOMITING: 0

## 2024-11-12 ENCOUNTER — CLINICAL SUPPORT (OUTPATIENT)
Dept: AUDIOLOGY | Facility: CLINIC | Age: 29
End: 2024-11-12
Payer: COMMERCIAL

## 2024-11-12 ENCOUNTER — APPOINTMENT (OUTPATIENT)
Dept: OTOLARYNGOLOGY | Facility: CLINIC | Age: 29
End: 2024-11-12
Payer: COMMERCIAL

## 2024-11-12 VITALS
TEMPERATURE: 97.8 F | BODY MASS INDEX: 32.69 KG/M2 | HEIGHT: 68 IN | SYSTOLIC BLOOD PRESSURE: 127 MMHG | DIASTOLIC BLOOD PRESSURE: 75 MMHG

## 2024-11-12 DIAGNOSIS — H92.01 RIGHT EAR PAIN: ICD-10-CM

## 2024-11-12 DIAGNOSIS — H92.01 OTALGIA OF RIGHT EAR: ICD-10-CM

## 2024-11-12 DIAGNOSIS — H69.91 DYSFUNCTION OF RIGHT EUSTACHIAN TUBE: Primary | ICD-10-CM

## 2024-11-12 PROCEDURE — 1036F TOBACCO NON-USER: CPT | Performed by: OTOLARYNGOLOGY

## 2024-11-12 PROCEDURE — 99203 OFFICE O/P NEW LOW 30 MIN: CPT | Performed by: OTOLARYNGOLOGY

## 2024-11-12 PROCEDURE — 92557 COMPREHENSIVE HEARING TEST: CPT | Performed by: AUDIOLOGIST

## 2024-11-12 PROCEDURE — 92550 TYMPANOMETRY & REFLEX THRESH: CPT | Performed by: AUDIOLOGIST

## 2024-11-12 ASSESSMENT — PAIN - FUNCTIONAL ASSESSMENT: PAIN_FUNCTIONAL_ASSESSMENT: 0-10

## 2024-11-12 ASSESSMENT — ENCOUNTER SYMPTOMS: OCCASIONAL FEELINGS OF UNSTEADINESS: 0

## 2024-11-12 ASSESSMENT — PAIN SCALES - GENERAL: PAINLEVEL_OUTOF10: 0 - NO PAIN

## 2024-11-12 NOTE — PROGRESS NOTES
AUDIOLOGY ADULT AUDIOMETRIC EVALUATION      Name:  Dutch Richardson  :  1995  Age:  29 y.o.  Date of Evaluation: 24    History:  Reason for visit:  Mr. Dutch Richardson was seen today as part of the visit with Alvin Gregory D.O. for an evaluation of hearing.   Chief Complaint   Patient presents with    Ear Pressure    Ear Fullness    Tinnitus      The patient stated a few months ago he experienced right sided sensations of right sided neck and ear pain. Stated he did complete a course of prednisone, however, did not notice any significant improvement in his symptoms.   He believes the pressure has resolved, but stated he has a history of sinus and allergy concerns.   History of recurrent ear infections and PE tubes as a child, but denied any recent ear/sinus infections.  Stated for the past few months, he has noticed some intermittent non-pulsatile non-bothersome ringing tinnitus at times.   History of loud noise exposure with the use of hearing protection, as he stated he may utilize firearms a few times a year.   No concerns for hearing loss or difficulty communicating.   Denied any current otalgia, aural fullness, ear pressure, dizziness/vertigo, ear surgery, recent ear/sinus infections, recent falls, sinus/throat concerns, ear drainage, or sudden hearing loss.    EVALUATION     See Audiogram    RESULTS:    Otoscopic Evaluation:   Right Ear: Otoscopy revealed a clear healthy canal and a healthy tympanic membrane with scarring on each tympanic membrane was visualized.   Left Ear: Otoscopy revealed a clear healthy canal and a healthy tympanic membrane with scarring on each tympanic membrane was visualized.     Immittance:  Immittance Measures: 226 Hz   Right Ear: Tympanometric testing revealed a normal type A tympanogram with normal middle ear pressure and normal static compliance.  Left Ear: Tympanometric testing revealed a normal type A tympanogram with essentially normal middle ear pressure (-145 daPa) and  normal static compliance.    Right Ear: Ipsilateral acoustic reflexes were present at, 500-2,000 Hz, at expected sensation levels and absent at 4,000 Hz.  Left Ear: Ipsilateral acoustic reflexes were present at, 500-2,000 Hz, at expected sensation levels and absent at 4,000 Hz.    Test technique:  Pure Tone Audiometry via insert earphones    Reliability:   excellent    Pure Tone Audiometry:    Right Ear: Audiometric testing indicated normal peripheral hearing sensitivity from 125-8,000 Hz.  Left Ear:   Audiometric testing indicated normal peripheral hearing sensitivity from 125-8,000 Hz.      Speech Audiometry:   Right Ear:  Speech Reception Threshold (SRT) was obtained at 10 dBHL                  Word Recognition scores were excellent (100%) in quiet when words were presented at 50 dBHL  Left Ear:  Speech Reception Threshold (SRT) was obtained at  10 dBHL                  Word Recognition scores were excellent (100%) in quiet when words were presented at 50 dBHL  Testing was performed with recorded NU-6 speech words in quiet. Speech thresholds were in good agreement with the pure tone averages in each ear.     IMPRESSIONS:  Today's test results are normal hearing sensitivity.  The patient was counseled with regard to the findings.    RECOMMENDATIONS:  * Continue medical follow up with Alvin Gregory D.O.  * Retest as medically indicated, or sooner if a change in hearing sensitivity or tinnitus is noticed.   * Wear hearing protection while in the presence of loud sounds.   * Use tinnitus coping strategies as needed, such as sound apps on a smart phone, utilizing calming noise in the room, running a fan at night, etc.   * Use effective communication strategies such as asking the speaker to gain attention prior to speaking, speaking in the same room, repeating words that were heard, etc.    PATIENT EDUCATION:   Discussed results and recommendations with the patient and a copy of the hearing test was provided.  Questions  were addressed and the patient was encouraged to contact our department should concerns arise.  The patient was seen from  2:00-2:30 pm.

## 2024-11-12 NOTE — PROGRESS NOTES
Impression:  1. Dysfunction of right eustachian tube        2. Right ear pain  Referral to ENT           RECOMMENDATIONS/PLAN :  I reassured the patient that his tympanic membrane's are moving normally and his hearing is normal.  More than likely he is dealing with eustachian tube pressure.  I would recommend that he restart Flonase nasal spray-2 puffs each nostril daily if his ears plugged up once again.  He may benefit from using the spray in the spring and fall.  He can otherwise follow-up as needed.      **This electronic medical record note was created with the use of voice recognition software.  Despite proofreading, typographical or grammatical errors may be present that could affect meaning of content **    Subjective   Patient ID:     Dutch Richardson is a 29 y.o. male who presents to the office today after he initially had some pain around his left ear that radiated towards his right jaw.  He denies any TMJ tenderness or popping or clicking.  No drainage from the ears.  When he was a child he had frequent middle ear infections.  His hearing is stable.  No imbalance or vertigo.  Today he feels normal.    ROS:  A detailed 12 system review of systems is noted on the intake form has been reviewed with the patient with details noted in the HPI and scanned into the patient's medical record.    Objective     Past Medical History:   Diagnosis Date    Allergic         Past Surgical History:   Procedure Laterality Date    ADENOIDECTOMY      HERNIA REPAIR          No Known Allergies     No current outpatient medications on file.     Tobacco Use: Medium Risk (11/12/2024)    Patient History     Smoking Tobacco Use: Never     Smokeless Tobacco Use: Former     Passive Exposure: Not on file        Alcohol Use: Unknown (2/10/2020)    [facility]     Frequency of Alcohol Consumption: Monthly or less     Average Number of Drinks: Not on file     Frequency of Binge Drinking: Not on file     "    Social History     Substance and Sexual Activity   Drug Use Never        Physical Exam:  Visit Vitals  /75   Temp 36.6 °C (97.8 °F) (Temporal)   Ht 1.727 m (5' 8\")   BMI 32.69 kg/m²   Smoking Status Never   BSA 2.16 m²      General: Patient is alert, oriented, cooperative in no apparent distress.  Head: Normocephalic, atraumatic.  Eyes: PERRL, EOMI, Conjunctiva is clear. No nystagmus.  Ears: Right Ear-- Pinna is normal.  External auditory canal is patent. Tympanic membrane is intact with significant tympanosclerosis.  No active effusion..  Mastoid is nontender.  Left ear-- Pinna is normal.  External auditory canal is patent. Tympanic membrane is intact with significant tympanosclerosis.  No effusion..  Mastoid is nontender.  Nose: Septum is relatively straight.  No septal perforation or lesions. No septal hematoma/ seroma.  No signs of bleeding.  Inferior turbinates are mildly swollen.   No evidence of intranasal polyps.  No infectious drainage.  Throat:  Floor of mouth is clear, no masses.  Tongue appears normal, no lesions or masses. Gums, gingiva, buccal mucosa appear pink and moist, no lesions. Teeth are in good repair.  No obvious dental infections.  Peritonsillar regions appear symmetric without swelling.  Hard and soft palate appear normal, no obvious cleft. Uvula is midline.  Oropharynx: No lesions. Retropharyngeal wall is flat.  No active postnasal drip.  Neck: Supple,  no lymphadenopathy.  No masses.  Salivary Glands: Symmetric bilaterally.  No palpable masses.  No evidence of acute infection or salivary stones  Neurologic: Cranial Nerves 2-12 are grossly intact without focal deficits. Cerebellar function testing is normal.     Results:   I reviewed his recent audiogram and his hearing is within normal limits.  Tympanic membrane's have fair mobility on tympanometry.  Word recognition scores 100% bilaterally.  Speech reception threshold is 10 dB bilaterally.    Procedure:   []    Alvin Gregory, DO "

## 2025-02-06 ENCOUNTER — OFFICE VISIT (OUTPATIENT)
Dept: UROLOGY | Facility: CLINIC | Age: 30
End: 2025-02-06
Payer: COMMERCIAL

## 2025-02-06 VITALS — HEART RATE: 69 BPM | DIASTOLIC BLOOD PRESSURE: 85 MMHG | SYSTOLIC BLOOD PRESSURE: 144 MMHG

## 2025-02-06 DIAGNOSIS — N45.1 EPIDIDYMITIS: ICD-10-CM

## 2025-02-06 DIAGNOSIS — N50.811 TESTICULAR PAIN, RIGHT: Primary | ICD-10-CM

## 2025-02-06 LAB
POC APPEARANCE, URINE: CLEAR
POC BILIRUBIN, URINE: NEGATIVE
POC BLOOD, URINE: NEGATIVE
POC COLOR, URINE: YELLOW
POC GLUCOSE, URINE: NEGATIVE MG/DL
POC KETONES, URINE: NEGATIVE MG/DL
POC LEUKOCYTES, URINE: NEGATIVE
POC NITRITE,URINE: NEGATIVE
POC PH, URINE: 6 PH
POC PROTEIN, URINE: NEGATIVE MG/DL
POC SPECIFIC GRAVITY, URINE: 1.02
POC UROBILINOGEN, URINE: 0.2 EU/DL

## 2025-02-06 PROCEDURE — 1036F TOBACCO NON-USER: CPT | Performed by: NURSE PRACTITIONER

## 2025-02-06 PROCEDURE — 81003 URINALYSIS AUTO W/O SCOPE: CPT | Performed by: NURSE PRACTITIONER

## 2025-02-06 PROCEDURE — 99214 OFFICE O/P EST MOD 30 MIN: CPT | Performed by: NURSE PRACTITIONER

## 2025-02-06 PROCEDURE — G2211 COMPLEX E/M VISIT ADD ON: HCPCS | Performed by: NURSE PRACTITIONER

## 2025-02-06 RX ORDER — LEVOFLOXACIN 500 MG/1
500 TABLET, FILM COATED ORAL DAILY
Qty: 10 TABLET | Refills: 0 | Status: SHIPPED | OUTPATIENT
Start: 2025-02-06 | End: 2025-02-16

## 2025-02-06 NOTE — PROGRESS NOTES
Subjective   Patient ID: Dutch Richardson is a 29 y.o. male who presents for Right Testicle Pain .  Presents for follow up of testicular pain began in early March 2024. Saw Dr. Marroquin and treated with Cipro for 1 month. Felt that symptoms were better after 2 weeks. Went for a run with dramatic exacerbation of pain. Completed full course of antibiotics without any benefit from baseline. States he has been off of antibiotics since end of March 2024. States pain has been intermittent. Typically occurs all day long. Often has pain with water during shower. Variable pain especially if its cold as well as pants. Pain seems to be common with running. Patient has been active with PFPT. Very pleased with improvement in symptoms initially in Nov 2024. Unable to continue in 2024 as he met insurance max number of PT visits.     States he has had recurrence of symptoms over the last 2 weeks on the right side. He states he is feeling a constant squeeze above the right testicle, radiating up to the RLQ.     Previously drinking 2+ gallons of water daily, has decreased intake with some benefit. Also tenderness at the base. Seen for eval with unremarkable ultrasound. Symptoms resolved. Took meloxicam with some benefit, not fully resolved. Developed back pain requiring course of prednisone.      Works as a  in [place].      Remote history of testicular torsion with surgical intervention in 2009.              Review of Systems   All other systems reviewed and are negative.      Objective   Physical Exam  Vitals reviewed.   Genitourinary:     Penis: Normal and circumcised.       Testes:         Right: Tenderness present.         Left: Mass present. Tenderness not present.      Epididymis:      Right: Tenderness present.     Alert and oriented x3  Moist mucous membranes  Breathes easily on room air  Abdomen soft, nondistended. Obese  No edema  No scleral icterus  No focal neurological deficits  Appears stated age, no acute  distress    Office Visit on 02/06/2025   Component Date Value Ref Range Status    POC Color, Urine 02/06/2025 Yellow  Straw, Yellow, Light-Yellow Final    POC Appearance, Urine 02/06/2025 Clear  Clear Final    POC Glucose, Urine 02/06/2025 NEGATIVE  NEGATIVE mg/dl Final    POC Bilirubin, Urine 02/06/2025 NEGATIVE  NEGATIVE Final    POC Ketones, Urine 02/06/2025 NEGATIVE  NEGATIVE mg/dl Final    POC Specific Gravity, Urine 02/06/2025 1.025  1.005 - 1.035 Final    POC Blood, Urine 02/06/2025 NEGATIVE  NEGATIVE Final    POC PH, Urine 02/06/2025 6.0  No Reference Range Established PH Final    POC Protein, Urine 02/06/2025 NEGATIVE  NEGATIVE mg/dl Final    POC Urobilinogen, Urine 02/06/2025 0.2  0.2, 1.0 EU/DL Final    Poc Nitrite, Urine 02/06/2025 NEGATIVE  NEGATIVE Final    POC Leukocytes, Urine 02/06/2025 NEGATIVE  NEGATIVE Final         Assessment/Plan   Diagnoses and all orders for this visit:  Testicular pain, right  -     POCT UA Automated manually resulted  Epididymitis  -     levoFLOXacin (Levaquin) 500 mg tablet; Take 1 tablet (500 mg) by mouth once daily for 10 days.    Exam concerning for epididymitis. Will treat with levaquin. If symptoms do not resolve we will proceed with ultrasound for further eval. Patient is in agreement with plan. May restart PFPT.        ANNELISE Simpson-CNP 02/06/25 2:11 PM

## 2025-02-18 DIAGNOSIS — N50.811 RIGHT TESTICULAR PAIN: Primary | ICD-10-CM

## 2025-02-21 ENCOUNTER — HOSPITAL ENCOUNTER (OUTPATIENT)
Dept: RADIOLOGY | Facility: CLINIC | Age: 30
Discharge: HOME | End: 2025-02-21
Payer: COMMERCIAL

## 2025-02-21 DIAGNOSIS — N50.811 RIGHT TESTICULAR PAIN: ICD-10-CM

## 2025-02-21 PROCEDURE — 76870 US EXAM SCROTUM: CPT

## 2025-03-26 ASSESSMENT — PROMIS GLOBAL HEALTH SCALE
RATE_QUALITY_OF_LIFE: EXCELLENT
RATE_SOCIAL_SATISFACTION: EXCELLENT
CARRYOUT_PHYSICAL_ACTIVITIES: COMPLETELY
CARRYOUT_SOCIAL_ACTIVITIES: EXCELLENT
RATE_PHYSICAL_HEALTH: EXCELLENT
RATE_GENERAL_HEALTH: EXCELLENT
EMOTIONAL_PROBLEMS: NEVER
RATE_AVERAGE_PAIN: 0
RATE_MENTAL_HEALTH: EXCELLENT

## 2025-04-01 ENCOUNTER — APPOINTMENT (OUTPATIENT)
Dept: PRIMARY CARE | Facility: CLINIC | Age: 30
End: 2025-04-01
Payer: COMMERCIAL

## 2025-04-01 VITALS
SYSTOLIC BLOOD PRESSURE: 130 MMHG | HEART RATE: 63 BPM | DIASTOLIC BLOOD PRESSURE: 88 MMHG | TEMPERATURE: 97.8 F | WEIGHT: 214.4 LBS | HEIGHT: 68 IN | BODY MASS INDEX: 32.49 KG/M2 | OXYGEN SATURATION: 97 %

## 2025-04-01 DIAGNOSIS — E66.811 CLASS 1 OBESITY DUE TO EXCESS CALORIES WITH SERIOUS COMORBIDITY AND BODY MASS INDEX (BMI) OF 32.0 TO 32.9 IN ADULT: ICD-10-CM

## 2025-04-01 DIAGNOSIS — Z00.00 ROUTINE GENERAL MEDICAL EXAMINATION AT A HEALTH CARE FACILITY: ICD-10-CM

## 2025-04-01 DIAGNOSIS — E66.09 CLASS 1 OBESITY DUE TO EXCESS CALORIES WITH SERIOUS COMORBIDITY AND BODY MASS INDEX (BMI) OF 32.0 TO 32.9 IN ADULT: ICD-10-CM

## 2025-04-01 DIAGNOSIS — R53.82 CHRONIC FATIGUE: Primary | ICD-10-CM

## 2025-04-01 LAB
ALBUMIN SERPL-MCNC: 4.6 G/DL (ref 3.6–5.1)
ALP SERPL-CCNC: 61 U/L (ref 36–130)
ALT SERPL-CCNC: 24 U/L (ref 9–46)
ANION GAP SERPL CALCULATED.4IONS-SCNC: 6 MMOL/L (CALC) (ref 7–17)
AST SERPL-CCNC: 23 U/L (ref 10–40)
BASOPHILS # BLD AUTO: 60 CELLS/UL (ref 0–200)
BASOPHILS NFR BLD AUTO: 0.9 %
BILIRUB SERPL-MCNC: 1.1 MG/DL (ref 0.2–1.2)
BUN SERPL-MCNC: 14 MG/DL (ref 7–25)
CALCIUM SERPL-MCNC: 9.3 MG/DL (ref 8.6–10.3)
CHLORIDE SERPL-SCNC: 102 MMOL/L (ref 98–110)
CHOLEST SERPL-MCNC: 175 MG/DL
CHOLEST/HDLC SERPL: 3.6 (CALC)
CO2 SERPL-SCNC: 30 MMOL/L (ref 20–32)
CREAT SERPL-MCNC: 0.9 MG/DL (ref 0.6–1.26)
EGFRCR SERPLBLD CKD-EPI 2021: 118 ML/MIN/1.73M2
EOSINOPHIL # BLD AUTO: 168 CELLS/UL (ref 15–500)
EOSINOPHIL NFR BLD AUTO: 2.5 %
ERYTHROCYTE [DISTWIDTH] IN BLOOD BY AUTOMATED COUNT: 12.4 % (ref 11–15)
GLUCOSE SERPL-MCNC: 90 MG/DL (ref 65–99)
HCT VFR BLD AUTO: 45.7 % (ref 38.5–50)
HDLC SERPL-MCNC: 48 MG/DL
HGB BLD-MCNC: 15.4 G/DL (ref 13.2–17.1)
LDLC SERPL CALC-MCNC: 113 MG/DL (CALC)
LYMPHOCYTES # BLD AUTO: 2995 CELLS/UL (ref 850–3900)
LYMPHOCYTES NFR BLD AUTO: 44.7 %
MCH RBC QN AUTO: 29.7 PG (ref 27–33)
MCHC RBC AUTO-ENTMCNC: 33.7 G/DL (ref 32–36)
MCV RBC AUTO: 88.2 FL (ref 80–100)
MONOCYTES # BLD AUTO: 623 CELLS/UL (ref 200–950)
MONOCYTES NFR BLD AUTO: 9.3 %
NEUTROPHILS # BLD AUTO: 2854 CELLS/UL (ref 1500–7800)
NEUTROPHILS NFR BLD AUTO: 42.6 %
NONHDLC SERPL-MCNC: 127 MG/DL (CALC)
PLATELET # BLD AUTO: 262 THOUSAND/UL (ref 140–400)
PMV BLD REES-ECKER: 10.2 FL (ref 7.5–12.5)
POTASSIUM SERPL-SCNC: 4.4 MMOL/L (ref 3.5–5.3)
PROT SERPL-MCNC: 7.1 G/DL (ref 6.1–8.1)
RBC # BLD AUTO: 5.18 MILLION/UL (ref 4.2–5.8)
SODIUM SERPL-SCNC: 138 MMOL/L (ref 135–146)
TESTOST SERPL-MCNC: 874 NG/DL (ref 250–827)
TRIGL SERPL-MCNC: 46 MG/DL
WBC # BLD AUTO: 6.7 THOUSAND/UL (ref 3.8–10.8)

## 2025-04-01 PROCEDURE — 3008F BODY MASS INDEX DOCD: CPT | Performed by: FAMILY MEDICINE

## 2025-04-01 PROCEDURE — 1036F TOBACCO NON-USER: CPT | Performed by: FAMILY MEDICINE

## 2025-04-01 PROCEDURE — 99395 PREV VISIT EST AGE 18-39: CPT | Performed by: FAMILY MEDICINE

## 2025-04-01 ASSESSMENT — PATIENT HEALTH QUESTIONNAIRE - PHQ9
1. LITTLE INTEREST OR PLEASURE IN DOING THINGS: NOT AT ALL
2. FEELING DOWN, DEPRESSED OR HOPELESS: NOT AT ALL
SUM OF ALL RESPONSES TO PHQ9 QUESTIONS 1 AND 2: 0

## 2025-04-01 NOTE — PROGRESS NOTES
Subjective   Patient ID: Dutch Richardson is a 29 y.o. male who presents for No chief complaint on file..  HPI    Patient presents today for a physical. Does not  need form filled out. Is fasting fasting for blood work. Tries to eat a generally healthy diet. Does Exercises.     Patient would like to have his testosterone check due to fatigue and uneven sleep schedule.     Review of Systems  Constitutional:  no chills, no fever and no night sweats.  Eyes: no blurred vision and no eyesight problems.  ENT: no hearing loss, no nasal congestion, no hoarseness and no sore throat.  Neck: no mass (es) and no swelling.  Cardiovascular: no chest pain, no intermittent leg claudication, no lower extremity edema, no palpitation and no syncope.  Respiratory: no cough, no shortness of breath during exertion, no shortness of breath at rest and no wheezing.  Gastrointestinal: no abdominal pain, no blood in stools, no constipation, no diarrhea, no melena, no nausea, no rectal pain and no vomiting.  Genitourinary: no dysuria, no change in urinary frequency, no urinary hesitancy and no feelings of urinary urgency.  Musculoskeletal: no arthralgias, no back pain and no myalgias.  Integumentary: no new skin lesions and no rashes.  Neurological: no difficulty walking, no headache, no limb weakness, no numbness and no tingling.  Psychiatric/Behavioral: no anxiety, no depression, no anhedonia and no substance use disorders.  Endocrine: no recent weight gain and no recent weight loss.  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  Well-developed well-nourished muscular male in no acute distress denies chest pain or shortness of  breath with exertion..Normal adult  There were no vitals taken for this visit.    Lab Results   Component Value Date    WBC 9.6 03/08/2024    HGB 14.7 03/08/2024    HCT 42.7 03/08/2024    MCV 88 03/08/2024     03/08/2024       Assessment/Plan follow-up as soon as we have his test results  Problem List Items Addressed This Visit    None

## 2025-05-03 ENCOUNTER — OFFICE VISIT (OUTPATIENT)
Dept: URGENT CARE | Age: 30
End: 2025-05-03
Payer: COMMERCIAL

## 2025-05-03 VITALS
RESPIRATION RATE: 16 BRPM | HEART RATE: 68 BPM | DIASTOLIC BLOOD PRESSURE: 78 MMHG | HEIGHT: 68 IN | WEIGHT: 210 LBS | OXYGEN SATURATION: 99 % | TEMPERATURE: 98.2 F | SYSTOLIC BLOOD PRESSURE: 122 MMHG | BODY MASS INDEX: 31.83 KG/M2

## 2025-05-03 DIAGNOSIS — S29.019A THORACIC MYOFASCIAL STRAIN, INITIAL ENCOUNTER: Primary | ICD-10-CM

## 2025-05-03 RX ORDER — PREDNISONE 20 MG/1
20 TABLET ORAL DAILY
Qty: 6 TABLET | Refills: 0 | Status: SHIPPED | OUTPATIENT
Start: 2025-05-03 | End: 2025-05-09

## 2025-05-03 RX ORDER — KETOROLAC TROMETHAMINE 30 MG/ML
30 INJECTION, SOLUTION INTRAMUSCULAR; INTRAVENOUS ONCE
Status: COMPLETED | OUTPATIENT
Start: 2025-05-03 | End: 2025-05-03

## 2025-05-03 RX ORDER — METHYLPREDNISOLONE SODIUM SUCCINATE 125 MG/2ML
125 INJECTION INTRAMUSCULAR; INTRAVENOUS ONCE
Status: COMPLETED | OUTPATIENT
Start: 2025-05-03 | End: 2025-05-03

## 2025-05-03 RX ORDER — KETOROLAC TROMETHAMINE 10 MG/1
10 TABLET, FILM COATED ORAL EVERY 8 HOURS PRN
Qty: 15 TABLET | Refills: 0 | Status: SHIPPED | OUTPATIENT
Start: 2025-05-03 | End: 2025-05-08

## 2025-05-03 RX ADMIN — KETOROLAC TROMETHAMINE 30 MG: 30 INJECTION, SOLUTION INTRAMUSCULAR; INTRAVENOUS at 09:56

## 2025-05-03 RX ADMIN — METHYLPREDNISOLONE SODIUM SUCCINATE 125 MG: 125 INJECTION INTRAMUSCULAR; INTRAVENOUS at 09:56

## 2025-05-03 RX ADMIN — KETOROLAC TROMETHAMINE 30 MG: 30 INJECTION, SOLUTION INTRAMUSCULAR; INTRAVENOUS at 09:57

## 2025-05-03 ASSESSMENT — ENCOUNTER SYMPTOMS: BACK PAIN: 1

## 2025-05-03 ASSESSMENT — PAIN SCALES - GENERAL: PAINLEVEL_OUTOF10: 10-WORST PAIN EVER

## 2025-05-03 NOTE — PATIENT INSTRUCTIONS
Thoracic Muscle Strain:  -Can continue with muscle relaxer you have at home  - Start oral steroid tomorrow, given IM steroid in Urgent Care today  - Next dose of Ketoralac (Toradol) can be taken around 545-6pm tonight; do not take any other NSAIDs while taking this medication (no motrin, ibuprofen, Aleve, Advil, Celebrex, Meloxicam ext)  - Lidocaine patch to the area  - Deep breathing exercises  - Stretches  - Ice and Heat, do not put directly on skin; after using heat do not apply ice for at least 45 minutes; Ice helps with inflammation and heat helps with muscle relaxation  - Follow-up with PCP In the next 3-5 days  - Advised patient on s/s to seek emergent care for

## 2025-05-08 ENCOUNTER — APPOINTMENT (OUTPATIENT)
Dept: UROLOGY | Facility: CLINIC | Age: 30
End: 2025-05-08
Payer: COMMERCIAL

## 2025-06-08 ENCOUNTER — APPOINTMENT (OUTPATIENT)
Dept: CARDIOLOGY | Facility: HOSPITAL | Age: 30
End: 2025-06-08
Payer: COMMERCIAL

## 2025-06-08 ENCOUNTER — HOSPITAL ENCOUNTER (EMERGENCY)
Facility: HOSPITAL | Age: 30
Discharge: HOME | End: 2025-06-08
Attending: EMERGENCY MEDICINE
Payer: COMMERCIAL

## 2025-06-08 ENCOUNTER — APPOINTMENT (OUTPATIENT)
Dept: RADIOLOGY | Facility: HOSPITAL | Age: 30
End: 2025-06-08
Payer: COMMERCIAL

## 2025-06-08 VITALS
HEART RATE: 58 BPM | SYSTOLIC BLOOD PRESSURE: 128 MMHG | BODY MASS INDEX: 32.58 KG/M2 | TEMPERATURE: 97.5 F | WEIGHT: 215 LBS | DIASTOLIC BLOOD PRESSURE: 74 MMHG | HEIGHT: 68 IN | OXYGEN SATURATION: 96 % | RESPIRATION RATE: 18 BRPM

## 2025-06-08 DIAGNOSIS — R07.9 CHEST PAIN, UNSPECIFIED TYPE: Primary | ICD-10-CM

## 2025-06-08 LAB
ALBUMIN SERPL BCP-MCNC: 4.7 G/DL (ref 3.4–5)
ALP SERPL-CCNC: 65 U/L (ref 33–120)
ALT SERPL W P-5'-P-CCNC: 30 U/L (ref 10–52)
ANION GAP SERPL CALC-SCNC: 11 MMOL/L (ref 10–20)
AST SERPL W P-5'-P-CCNC: 24 U/L (ref 9–39)
BASOPHILS # BLD AUTO: 0.1 X10*3/UL (ref 0–0.1)
BASOPHILS NFR BLD AUTO: 1.2 %
BILIRUB SERPL-MCNC: 0.8 MG/DL (ref 0–1.2)
BNP SERPL-MCNC: 16 PG/ML (ref 0–99)
BUN SERPL-MCNC: 16 MG/DL (ref 6–23)
CALCIUM SERPL-MCNC: 9.6 MG/DL (ref 8.6–10.3)
CARDIAC TROPONIN I PNL SERPL HS: 3 NG/L (ref 0–20)
CARDIAC TROPONIN I PNL SERPL HS: <3 NG/L (ref 0–20)
CHLORIDE SERPL-SCNC: 102 MMOL/L (ref 98–107)
CO2 SERPL-SCNC: 28 MMOL/L (ref 21–32)
CREAT SERPL-MCNC: 0.99 MG/DL (ref 0.5–1.3)
EGFRCR SERPLBLD CKD-EPI 2021: >90 ML/MIN/1.73M*2
EOSINOPHIL # BLD AUTO: 0.28 X10*3/UL (ref 0–0.7)
EOSINOPHIL NFR BLD AUTO: 3.5 %
ERYTHROCYTE [DISTWIDTH] IN BLOOD BY AUTOMATED COUNT: 12.6 % (ref 11.5–14.5)
GLUCOSE SERPL-MCNC: 98 MG/DL (ref 74–99)
HCT VFR BLD AUTO: 47 % (ref 41–52)
HGB BLD-MCNC: 16.3 G/DL (ref 13.5–17.5)
IMM GRANULOCYTES # BLD AUTO: 0.02 X10*3/UL (ref 0–0.7)
IMM GRANULOCYTES NFR BLD AUTO: 0.2 % (ref 0–0.9)
INR PPP: 1 (ref 0.9–1.1)
LYMPHOCYTES # BLD AUTO: 3.7 X10*3/UL (ref 1.2–4.8)
LYMPHOCYTES NFR BLD AUTO: 46 %
MCH RBC QN AUTO: 30 PG (ref 26–34)
MCHC RBC AUTO-ENTMCNC: 34.7 G/DL (ref 32–36)
MCV RBC AUTO: 87 FL (ref 80–100)
MONOCYTES # BLD AUTO: 0.8 X10*3/UL (ref 0.1–1)
MONOCYTES NFR BLD AUTO: 9.9 %
NEUTROPHILS # BLD AUTO: 3.15 X10*3/UL (ref 1.2–7.7)
NEUTROPHILS NFR BLD AUTO: 39.2 %
NRBC BLD-RTO: 0 /100 WBCS (ref 0–0)
PLATELET # BLD AUTO: 247 X10*3/UL (ref 150–450)
POTASSIUM SERPL-SCNC: 4 MMOL/L (ref 3.5–5.3)
PROT SERPL-MCNC: 7.3 G/DL (ref 6.4–8.2)
PROTHROMBIN TIME: 10.6 SECONDS (ref 9.8–12.4)
RBC # BLD AUTO: 5.43 X10*6/UL (ref 4.5–5.9)
SODIUM SERPL-SCNC: 137 MMOL/L (ref 136–145)
WBC # BLD AUTO: 8.1 X10*3/UL (ref 4.4–11.3)

## 2025-06-08 PROCEDURE — 83880 ASSAY OF NATRIURETIC PEPTIDE: CPT | Performed by: EMERGENCY MEDICINE

## 2025-06-08 PROCEDURE — 36415 COLL VENOUS BLD VENIPUNCTURE: CPT | Performed by: EMERGENCY MEDICINE

## 2025-06-08 PROCEDURE — 84484 ASSAY OF TROPONIN QUANT: CPT | Performed by: EMERGENCY MEDICINE

## 2025-06-08 PROCEDURE — 93005 ELECTROCARDIOGRAM TRACING: CPT

## 2025-06-08 PROCEDURE — 85610 PROTHROMBIN TIME: CPT | Performed by: EMERGENCY MEDICINE

## 2025-06-08 PROCEDURE — 99285 EMERGENCY DEPT VISIT HI MDM: CPT | Performed by: EMERGENCY MEDICINE

## 2025-06-08 PROCEDURE — 85025 COMPLETE CBC W/AUTO DIFF WBC: CPT | Performed by: EMERGENCY MEDICINE

## 2025-06-08 PROCEDURE — 96374 THER/PROPH/DIAG INJ IV PUSH: CPT

## 2025-06-08 PROCEDURE — 99285 EMERGENCY DEPT VISIT HI MDM: CPT | Mod: 25 | Performed by: EMERGENCY MEDICINE

## 2025-06-08 PROCEDURE — 71045 X-RAY EXAM CHEST 1 VIEW: CPT

## 2025-06-08 PROCEDURE — 80053 COMPREHEN METABOLIC PANEL: CPT | Performed by: EMERGENCY MEDICINE

## 2025-06-08 PROCEDURE — 71045 X-RAY EXAM CHEST 1 VIEW: CPT | Performed by: RADIOLOGY

## 2025-06-08 PROCEDURE — 2500000004 HC RX 250 GENERAL PHARMACY W/ HCPCS (ALT 636 FOR OP/ED): Mod: JZ | Performed by: EMERGENCY MEDICINE

## 2025-06-08 RX ORDER — KETOROLAC TROMETHAMINE 15 MG/ML
15 INJECTION, SOLUTION INTRAMUSCULAR; INTRAVENOUS ONCE
Status: COMPLETED | OUTPATIENT
Start: 2025-06-08 | End: 2025-06-08

## 2025-06-08 RX ADMIN — KETOROLAC TROMETHAMINE 15 MG: 15 INJECTION, SOLUTION INTRAMUSCULAR; INTRAVENOUS at 12:39

## 2025-06-08 ASSESSMENT — PAIN DESCRIPTION - ONSET: ONSET: ONGOING

## 2025-06-08 ASSESSMENT — PAIN DESCRIPTION - DESCRIPTORS
DESCRIPTORS: SHARP
DESCRIPTORS: PRESSURE
DESCRIPTORS: STABBING;SHARP;SHOOTING

## 2025-06-08 ASSESSMENT — PAIN DESCRIPTION - PAIN TYPE: TYPE: ACUTE PAIN

## 2025-06-08 ASSESSMENT — PAIN DESCRIPTION - LOCATION: LOCATION: CHEST

## 2025-06-08 ASSESSMENT — LIFESTYLE VARIABLES
EVER HAD A DRINK FIRST THING IN THE MORNING TO STEADY YOUR NERVES TO GET RID OF A HANGOVER: NO
EVER FELT BAD OR GUILTY ABOUT YOUR DRINKING: NO
TOTAL SCORE: 0
HAVE PEOPLE ANNOYED YOU BY CRITICIZING YOUR DRINKING: NO
HAVE YOU EVER FELT YOU SHOULD CUT DOWN ON YOUR DRINKING: NO

## 2025-06-08 ASSESSMENT — PAIN DESCRIPTION - PROGRESSION: CLINICAL_PROGRESSION: NOT CHANGED

## 2025-06-08 ASSESSMENT — COLUMBIA-SUICIDE SEVERITY RATING SCALE - C-SSRS
2. HAVE YOU ACTUALLY HAD ANY THOUGHTS OF KILLING YOURSELF?: NO
1. IN THE PAST MONTH, HAVE YOU WISHED YOU WERE DEAD OR WISHED YOU COULD GO TO SLEEP AND NOT WAKE UP?: NO
6. HAVE YOU EVER DONE ANYTHING, STARTED TO DO ANYTHING, OR PREPARED TO DO ANYTHING TO END YOUR LIFE?: NO

## 2025-06-08 ASSESSMENT — PAIN - FUNCTIONAL ASSESSMENT: PAIN_FUNCTIONAL_ASSESSMENT: 0-10

## 2025-06-08 ASSESSMENT — PAIN SCALES - GENERAL
PAINLEVEL_OUTOF10: 6
PAINLEVEL_OUTOF10: 6

## 2025-06-08 ASSESSMENT — PAIN DESCRIPTION - ORIENTATION: ORIENTATION: MID

## 2025-06-08 NOTE — DISCHARGE INSTRUCTIONS
Please return to the ER or seek immediate medical attention if you experience new or worsening chest pain, shortness of breath, fever of 38C (100.4) or higher, persistent vomiting, weakness, numbness, tingling, excessive sweating,  loss of motion in your arms or legs, fainting, vision changes, or any new or worsening symptoms.    You are welcome back any time. Thank you for entrusting your care to us, I hope we made your visit as pleasant as possible. Wishing you well!    Dr. Cortez

## 2025-06-09 LAB
ATRIAL RATE: 61 BPM
ATRIAL RATE: 63 BPM
P AXIS: 21 DEGREES
P AXIS: 31 DEGREES
P OFFSET: 168 MS
P OFFSET: 187 MS
P ONSET: 116 MS
P ONSET: 128 MS
PR INTERVAL: 184 MS
PR INTERVAL: 196 MS
Q ONSET: 208 MS
Q ONSET: 226 MS
QRS COUNT: 10 BEATS
QRS COUNT: 10 BEATS
QRS DURATION: 96 MS
QRS DURATION: 96 MS
QT INTERVAL: 382 MS
QT INTERVAL: 394 MS
QTC CALCULATION(BAZETT): 384 MS
QTC CALCULATION(BAZETT): 403 MS
QTC FREDERICIA: 383 MS
QTC FREDERICIA: 400 MS
R AXIS: 2 DEGREES
R AXIS: 7 DEGREES
T AXIS: 19 DEGREES
T AXIS: 21 DEGREES
T OFFSET: 405 MS
T OFFSET: 417 MS
VENTRICULAR RATE: 61 BPM
VENTRICULAR RATE: 63 BPM

## 2025-06-09 NOTE — ED PROVIDER NOTES
Emergency Department Provider Note        History of Present Illness     History provided by: Patient  Limitations to History: None  External Records Reviewed with Brief Summary: None    HPI:  Dutch Richardson is a 30 y.o. male presenting to the [facility] emergency department with a chief complaint of chest pain that began approximately an hour and a half before he arrived to the emergency department.  The patient was at his home about 2 began lifting weights and he does take his preworkout when he leaned forward and felt a sharp pain in his chest that was radiating across the left side of his chest starting in the sternum and extending to the left shoulder.  The patient says that he has never experienced anything like this before and he has been doing the same routine over the last 10 years for working out.  The patient has no significant past medical history or surgical history in relation to any cardiac issues or respiratory issues.  The patient says that he is experiencing pain every time he inhales a breath and when he is exerting himself he feels pain in his chest even more.      Physical Exam   Triage vitals:  T 36.4 °C (97.5 °F)  HR 72  /75  RR 10  O2 99 % None (Room air)    General: Awake, alert, in no acute distress  Eyes: Gaze conjugate.  No scleral icterus or injection  HENT: Normo-cephalic, atraumatic. No stridor  CV: Regular rate, regular rhythm. Radial pulses 2+ bilaterally  Resp: Breathing non-labored, speaking in full sentences.  Clear to auscultation bilaterally  GI: Soft, non-distended, non-tender. No rebound or guarding.  MSK/Extremities: No gross bony deformities. Moving all extremities  Skin: Warm. Appropriate color  Neuro: Alert. Oriented. Face symmetric. Speech is fluent.  Gross strength and sensation intact in b/l UE and LEs  Psych: Appropriate mood and affect    Medical Decision Making & ED Course   Medical Decision Makin y.o. male presenting with chest pain after first  experiencing it while working out.    Patient presents to [facility] ED with acute chest pain with  primary concern for possible acute coronary syndrome.      EKG shows normal sinus rhythm.    Initial troponin is 3    Given the patient’s lack of risk factors there is minimal concern for ACS, pulmonary embolism, or aortic dissection.  There is a possibility for exertional angina.    PERC is negative    Will discharge based on normal values and EKG during his workup.    Aspirin loading dose wasn’t necessary     Plavix loading dose wasn’t necessary     Atorvastatin wasn’t necessary     Metoprolol wasn’t necessary     Sublingual nitroglycerin wasn’t necessary     Cardiology consultation wasn’t necessary     Transthoracic echocardiography/CT angiography wont be necessary     Alternative diagnoses of pulmonary embolism, aortic dissection, and pericarditis are less likely based on clinical presentation but will remain vigilant.    On my interpretation of labs, results are unimpressive for systemic inflammation or infection, acute anemia or blood loss, BELLA, hepatitis, or electrolyte abnormalities.     Serial troponins and further workup are not essential for this patient and he will be safe for discharge.    Patient was given some Toradol and he had some relief with the chest pain.  This chest pain is most likely in relation to the musculoskeletal system and he will be following up with his primary care provider.    Shared decision making for disposition  Patient and/or patient´s representative was counseled regarding labs, imaging, likely diagnosis. All questions were answered. Recommendation was made for discharge home. The patient agreed and was discharged home in stable condition with appropriate relevant educational materials. Return precautions were provided which included new or worsening chest pain, shortness of breath, fever of 38C (100.4) or higher, persistent vomiting, weakness, numbness, tingling, excessive  sweating,  loss of motion in your arms or legs, fainting, vision changes, or any new or worsening symptoms..         ----  Differential diagnoses considered include but are not limited to: As noted in the [facility] above     Social Determinants of Health which Significantly Impact Care: None identified     EKG Independent Interpretation: EKG interpreted by myself. Please see ED Course for full interpretation.    Independent Result Review and Interpretation: Relevant laboratory and radiographic results were reviewed and independently interpreted by myself.  As necessary, they are commented on in the ED Course.    Chronic conditions affecting the patient's care: As documented above in [facility]    The patient was discussed with the following consultants/services: None    Care Considerations: As documented above in [facility]    ED Course:  ED Course as of 06/08/25 2312   Sun Jun 08, 2025   1026 ECG 12 lead  Patient EKG performed showing normal sinus rhythm.  Ventricular rate between 60 to 70 bpm.  Some arrhythmia noted with P waves before every QRS still.  Normal MI interval.  Narrow QRS duration.  Normal QTc.  Lead III as T wave inversion.  No ST elevations noted. [LOPEZ]      ED Course User Index  [LOPEZ] Nick Cortez DO         Diagnoses as of 06/08/25 2312   Chest pain, unspecified type     Disposition   As a result of the work-up, the patient was discharged home.  he was informed of his diagnosis and instructed to come back with any concerns or worsening of condition.  he and was agreeable to the plan as discussed above.  he was given the opportunity to ask questions.  All of the patient's questions were answered.    Procedures   Procedures    Patient seen and discussed with ED attending physician.    Nick Cortez DO  Emergency Medicine     Nick Cortez DO  Resident  06/08/25 2312

## 2025-06-30 ENCOUNTER — OFFICE VISIT (OUTPATIENT)
Dept: URGENT CARE | Age: 30
End: 2025-06-30
Payer: COMMERCIAL

## 2025-06-30 VITALS
TEMPERATURE: 97.8 F | RESPIRATION RATE: 16 BRPM | BODY MASS INDEX: 32.58 KG/M2 | HEIGHT: 68 IN | DIASTOLIC BLOOD PRESSURE: 63 MMHG | WEIGHT: 215 LBS | HEART RATE: 56 BPM | SYSTOLIC BLOOD PRESSURE: 134 MMHG | OXYGEN SATURATION: 97 %

## 2025-06-30 DIAGNOSIS — M79.601 PAIN IN RIGHT ARM: ICD-10-CM

## 2025-06-30 DIAGNOSIS — R42 VERTIGO: Primary | ICD-10-CM

## 2025-06-30 RX ORDER — MECLIZINE HYDROCHLORIDE 25 MG/1
25 TABLET ORAL 3 TIMES DAILY PRN
Qty: 21 TABLET | Refills: 0 | Status: SHIPPED | OUTPATIENT
Start: 2025-06-30 | End: 2025-07-07

## 2025-06-30 ASSESSMENT — ENCOUNTER SYMPTOMS: DIZZINESS: 1

## 2025-06-30 NOTE — PROGRESS NOTES
"Subjective   Patient ID: Dutch Richardson is a 30 y.o. male. They present today with a chief complaint of Dizziness (X1 wk; occasional headache, constant \"feels like car sickness\").    History of Present Illness  Subjective   Ducth is a 30-year-old patient who presents with eight days of dizziness, described as a sensation of the room spinning. He also reports fatigue and right arm pain that has been present for about two months. He denies injury to his arm but states the pain developed after lifting heavy items. He has also been experiencing occasional headaches and the vision in his right eye seems \"fuzzy.\". He denies fevers, chills, difficulty walking, double vision, speech abnormalities, weakness, numbness, paresthesia, chest pain, or palpitations. He is unable to schedule with his primary care provider at this time. There appears to be a history of benign positional vertigo documented. However, he is not aware of a history of Vertigo.     Review of Systems  Constitutional: Negative for fever, chills, anorexia, weight loss, malaise  Eye:  Denies diplopia     ENMT: Negative for nasal discharge, congestion, ear pain, mouth pain, throat pain   Respiratory: Negative for cough, hemoptysis, wheezing, shortness of breath   Cardiac: Negative for chest pain, dyspnea on exertion, orthopnea, palpitations, syncope   Gastrointestinal: Negative for nausea, vomiting, diarrhea, constipation, abdominal pain  Musculoskeletal: Positive for pain. Negative for decreased ROM, swelling, weakness   Neurological: Negative for confusion, seizures, syncope, paresthesia, numbness, weakness.    All other systems are negative            History provided by:  Patient   used: No    Dizziness      Past Medical History  Allergies as of 06/30/2025    (No Known Allergies)       Prescriptions Prior to Admission[1]     Medical History[2]    Surgical History[3]     reports that he has never smoked. He has never been exposed to tobacco " "smoke. He quit smokeless tobacco use about 4 years ago.  His smokeless tobacco use included snuff. He reports that he does not drink alcohol and does not use drugs.    Review of Systems  Review of Systems   Neurological:  Positive for dizziness.                                  Objective    Vitals:    06/30/25 1111   BP: 134/63   Pulse: 56   Resp: 16   Temp: 36.6 °C (97.8 °F)   TempSrc: Oral   SpO2: 97%   Weight: 97.5 kg (215 lb)   Height: 1.727 m (5' 8\")     No LMP for male patient.    Physical Exam  Vitals and nursing note reviewed.   Constitutional:       General: He is not in acute distress.     Appearance: Normal appearance. He is normal weight. He is not ill-appearing, toxic-appearing or diaphoretic.   HENT:      Right Ear: Tympanic membrane, ear canal and external ear normal. There is no impacted cerumen.      Left Ear: Tympanic membrane, ear canal and external ear normal. There is no impacted cerumen.   Eyes:      General: No visual field deficit or scleral icterus.        Right eye: No discharge.         Left eye: No discharge.      Extraocular Movements: Extraocular movements intact.      Conjunctiva/sclera: Conjunctivae normal.      Pupils: Pupils are equal, round, and reactive to light.   Cardiovascular:      Rate and Rhythm: Normal rate and regular rhythm.      Pulses: Normal pulses.      Heart sounds: Normal heart sounds. No murmur heard.     No friction rub. No gallop.   Pulmonary:      Effort: Pulmonary effort is normal. No respiratory distress.      Breath sounds: Normal breath sounds. No stridor. No wheezing, rhonchi or rales.   Musculoskeletal:         General: No swelling, tenderness, deformity or signs of injury. Normal range of motion.      Right elbow: Normal.      Left elbow: Normal.      Right forearm: Normal.      Left forearm: Normal.      Right wrist: Normal.      Left wrist: Normal.      Cervical back: Normal range of motion. No rigidity.   Skin:     General: Skin is warm and dry.      " Coloration: Skin is not jaundiced or pale.      Findings: No bruising, erythema or lesion.   Neurological:      General: No focal deficit present.      Mental Status: He is alert and oriented to person, place, and time.      GCS: GCS eye subscore is 4. GCS verbal subscore is 5. GCS motor subscore is 6.      Cranial Nerves: No cranial nerve deficit, dysarthria or facial asymmetry.      Sensory: Sensation is intact. No sensory deficit.      Motor: No weakness, tremor or abnormal muscle tone.      Coordination: Romberg sign positive. Coordination normal. Finger-Nose-Finger Test normal. Rapid alternating movements normal.      Gait: Gait is intact. Gait and tandem walk normal.         Procedures    Point of Care Test & Imaging Results from this visit  No results found for this visit on 06/30/25.   Imaging  No results found.    Cardiology, Vascular, and Other Imaging  No other imaging results found for the past 2 days      Diagnostic study results (if any) were reviewed by CLEMENTE Livingston.    Assessment/Plan   Allergies, medications, history, and pertinent labs/EKGs/Imaging reviewed by CLEMENTE Livingston.     Medical Decision Making    The patient's medical history, current medications, and allergies were reviewed and verified during today's visit.  Any discrepancies and updates were made when necessary.     On exam, the patient's vital signs are stable, and he is in no acute distress. There were no focal neurologic deficits identified on the exam. His ear exam was also normal. He did not have any dysarthria, weakness, or sensation abnormalities. His Romberg test was positive, however.  The vertigo sensation was reproduced while checking his extraocular movements. I spoke to the supervising physician to discuss the patient's symptoms and physical exam findings. I prescribed Meclizine and advised him to contact his PCP today for follow up. I asked him to go to the emergency room immediately if his dizziness  worsens or if he experiences severe headaches, blurred vision, weakness, numbness, or speech difficulty. He stated that he understood and would seek appropriate follow-up. Given the arm pain is chronic in nature and not associated with an injury, imaging is unlikely to show any abnormality. I referred him to orthopedics to follow up on his arm pain. He did not have any additional questions prior to discharge.     At time of discharge patient was clinically well-appearing and appropriate for outpatient management. The patient was educated regarding diagnosis, supportive care, OTC and Rx medications. The patient was given the opportunity to ask questions prior to discharge.  They verbalized understanding of my discussion of the plans for treatment, expected course, indications to return to  or seek further evaluation in ED, and the need for timely follow up as directed.        Orders and Diagnoses  There are no diagnoses linked to this encounter.    Medical Admin Record      Patient disposition: Home    Electronically signed by CLEMENTE Livingston  11:16 AM           [1] (Not in a hospital admission)  [2]   Past Medical History:  Diagnosis Date    Allergic    [3]   Past Surgical History:  Procedure Laterality Date    ADENOIDECTOMY      HERNIA REPAIR

## 2025-07-02 ENCOUNTER — OFFICE VISIT (OUTPATIENT)
Dept: PRIMARY CARE | Facility: CLINIC | Age: 30
End: 2025-07-02
Payer: COMMERCIAL

## 2025-07-02 VITALS
HEIGHT: 68 IN | SYSTOLIC BLOOD PRESSURE: 144 MMHG | WEIGHT: 219.4 LBS | DIASTOLIC BLOOD PRESSURE: 80 MMHG | OXYGEN SATURATION: 96 % | HEART RATE: 70 BPM | BODY MASS INDEX: 33.25 KG/M2 | TEMPERATURE: 98.3 F

## 2025-07-02 DIAGNOSIS — E66.811 CLASS 1 DRUG-INDUCED OBESITY WITHOUT SERIOUS COMORBIDITY WITH BODY MASS INDEX (BMI) OF 33.0 TO 33.9 IN ADULT: ICD-10-CM

## 2025-07-02 DIAGNOSIS — R42 DIZZINESS: Primary | ICD-10-CM

## 2025-07-02 DIAGNOSIS — E66.1 CLASS 1 DRUG-INDUCED OBESITY WITHOUT SERIOUS COMORBIDITY WITH BODY MASS INDEX (BMI) OF 33.0 TO 33.9 IN ADULT: ICD-10-CM

## 2025-07-02 DIAGNOSIS — F51.01 PRIMARY INSOMNIA: ICD-10-CM

## 2025-07-02 PROCEDURE — 99213 OFFICE O/P EST LOW 20 MIN: CPT | Performed by: FAMILY MEDICINE

## 2025-07-02 PROCEDURE — 1036F TOBACCO NON-USER: CPT | Performed by: FAMILY MEDICINE

## 2025-07-02 PROCEDURE — 3008F BODY MASS INDEX DOCD: CPT | Performed by: FAMILY MEDICINE

## 2025-07-02 RX ORDER — ZOLPIDEM TARTRATE 5 MG/1
5 TABLET ORAL NIGHTLY PRN
Qty: 7 TABLET | Refills: 0 | Status: SHIPPED | OUTPATIENT
Start: 2025-07-02 | End: 2025-07-09

## 2025-07-02 RX ORDER — PREDNISONE 10 MG/1
TABLET ORAL
Qty: 51 TABLET | Refills: 0 | Status: SHIPPED | OUTPATIENT
Start: 2025-07-02

## 2025-07-02 ASSESSMENT — ENCOUNTER SYMPTOMS: DEPRESSION: 0

## 2025-07-02 NOTE — PROGRESS NOTES
Subjective   Patient ID: Dutch Richardson is a 30 y.o. male who presents for Dizziness, Fatigue, and Vertigo.  HPI    Patient presents in office for dizziness and fatigue. Ongoing x 9 days. Patient has been experiencing stiffness and aching in right arm and that's been going on since may. He isn't sure if its associated with the dizziness and fatigue. Patient has also been getting headaches. He was seen in Urgent Care 6/30/25. He was diagnosed with vertigo and was prescribed Meclizine. He states he is still having the symptoms. Patient states he feels like jello and he only gets about 4 hours of sleep a night. He states he has never felt this way before. Patient says his vision has been a little fuzzy lately as well.       Review of Systems  Constitutional:  no chills, no fever and no night sweats.  Eyes: no blurred vision and no eyesight problems.  ENT: no hearing loss, no nasal congestion, no hoarseness and no sore throat.  Neck: no mass (es) and no swelling.  Cardiovascular: no chest pain, no intermittent leg claudication, no lower extremity edema, no palpitation and no syncope.  Respiratory: no cough, no shortness of breath during exertion, no shortness of breath at rest and no wheezing.  Gastrointestinal: no abdominal pain, no blood in stools, no constipation, no diarrhea, no melena, no nausea, no rectal pain and no vomiting.  Genitourinary: no dysuria, no change in urinary frequency, no urinary hesitancy and no feelings of urinary urgency.  Musculoskeletal: no arthralgias, no back pain and no myalgias.  Integumentary: no new skin lesions and no rashes.  Neurological: no difficulty walking, no headache, no limb weakness, no numbness and no tingling.  Psychiatric/Behavioral: no anxiety, no depression, no anhedonia and no substance use disorders.  Endocrine: no recent weight gain and no recent weight loss.  Hematologic/Lymphatic: no tendency for easy bruising and no swollen glands    Objective   Physical Exam  Patient  "here today with his young daughter he has been having 9 days of dizziness feeling off balance denies vertigo was seen in urgent care and started on meclizine may have helped a little bit he is also managing fatigue he has a long history of not sleeping well he works rotating shifts he is a .  Also some right arm numbness and tingling into the forearm no pain feels achy at the end of the day he was doing yard work digging in a hard ground.  Well-developed well-nourished muscular male in no acute distress TMs are clear pupils equal react light and accommodation extraocular muscles intact no nystagmus normal vision.  Lungs are clear cardiac and abdominal exams are benign.  He has achiness in the forearm musculature on the right side but no epicondylar pain no weakness in .  /80 (BP Location: Left arm, Patient Position: Sitting, BP Cuff Size: Adult)   Pulse 70   Temp 36.8 °C (98.3 °F) (Temporal)   Ht 1.727 m (5' 8\")   Wt 99.5 kg (219 lb 6.4 oz)   SpO2 96%   BMI 33.36 kg/m²     Lab Results   Component Value Date    WBC 8.1 06/08/2025    HGB 16.3 06/08/2025    HCT 47.0 06/08/2025    MCV 87 06/08/2025     06/08/2025       Assessment/Plan possible mild viral labyrinthitis he also has a history of insomnia we will put him on a burst and taper prednisone given 5 days 7 days worth of 5 mg Ambien and follow-up at that point let us know how he is doing.  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

## 2025-07-11 ENCOUNTER — APPOINTMENT (OUTPATIENT)
Dept: CARDIOLOGY | Facility: HOSPITAL | Age: 30
End: 2025-07-11
Payer: COMMERCIAL

## 2025-07-11 ENCOUNTER — HOSPITAL ENCOUNTER (EMERGENCY)
Facility: HOSPITAL | Age: 30
Discharge: HOME | End: 2025-07-12
Attending: STUDENT IN AN ORGANIZED HEALTH CARE EDUCATION/TRAINING PROGRAM
Payer: COMMERCIAL

## 2025-07-11 ENCOUNTER — APPOINTMENT (OUTPATIENT)
Dept: RADIOLOGY | Facility: HOSPITAL | Age: 30
End: 2025-07-11
Payer: COMMERCIAL

## 2025-07-11 ENCOUNTER — TELEPHONE (OUTPATIENT)
Dept: PRIMARY CARE | Facility: CLINIC | Age: 30
End: 2025-07-11

## 2025-07-11 DIAGNOSIS — R42 LIGHTHEADEDNESS: Primary | ICD-10-CM

## 2025-07-11 DIAGNOSIS — H72.93: ICD-10-CM

## 2025-07-11 DIAGNOSIS — R42 DIZZINESS: ICD-10-CM

## 2025-07-11 LAB
ALBUMIN SERPL BCP-MCNC: 4.1 G/DL (ref 3.4–5)
ALP SERPL-CCNC: 49 U/L (ref 33–120)
ALT SERPL W P-5'-P-CCNC: 23 U/L (ref 10–52)
ANION GAP SERPL CALC-SCNC: 13 MMOL/L (ref 10–20)
APPEARANCE UR: CLEAR
AST SERPL W P-5'-P-CCNC: 16 U/L (ref 9–39)
BASOPHILS # BLD AUTO: 0.07 X10*3/UL (ref 0–0.1)
BASOPHILS NFR BLD AUTO: 0.5 %
BILIRUB SERPL-MCNC: 0.6 MG/DL (ref 0–1.2)
BILIRUB UR STRIP.AUTO-MCNC: NEGATIVE MG/DL
BUN SERPL-MCNC: 21 MG/DL (ref 6–23)
CALCIUM SERPL-MCNC: 8.9 MG/DL (ref 8.6–10.3)
CARDIAC TROPONIN I PNL SERPL HS: <3 NG/L (ref 0–20)
CHLORIDE SERPL-SCNC: 106 MMOL/L (ref 98–107)
CO2 SERPL-SCNC: 26 MMOL/L (ref 21–32)
COLOR UR: YELLOW
CREAT SERPL-MCNC: 0.95 MG/DL (ref 0.5–1.3)
EGFRCR SERPLBLD CKD-EPI 2021: >90 ML/MIN/1.73M*2
EOSINOPHIL # BLD AUTO: 0.18 X10*3/UL (ref 0–0.7)
EOSINOPHIL NFR BLD AUTO: 1.2 %
ERYTHROCYTE [DISTWIDTH] IN BLOOD BY AUTOMATED COUNT: 12.6 % (ref 11.5–14.5)
GLUCOSE SERPL-MCNC: 87 MG/DL (ref 74–99)
GLUCOSE UR STRIP.AUTO-MCNC: NORMAL MG/DL
HCT VFR BLD AUTO: 45.1 % (ref 41–52)
HGB BLD-MCNC: 15.3 G/DL (ref 13.5–17.5)
IMM GRANULOCYTES # BLD AUTO: 0.1 X10*3/UL (ref 0–0.7)
IMM GRANULOCYTES NFR BLD AUTO: 0.7 % (ref 0–0.9)
KETONES UR STRIP.AUTO-MCNC: NEGATIVE MG/DL
LEUKOCYTE ESTERASE UR QL STRIP.AUTO: NEGATIVE
LYMPHOCYTES # BLD AUTO: 4.45 X10*3/UL (ref 1.2–4.8)
LYMPHOCYTES NFR BLD AUTO: 30.7 %
MAGNESIUM SERPL-MCNC: 1.95 MG/DL (ref 1.6–2.4)
MCH RBC QN AUTO: 29.7 PG (ref 26–34)
MCHC RBC AUTO-ENTMCNC: 33.9 G/DL (ref 32–36)
MCV RBC AUTO: 87 FL (ref 80–100)
MONOCYTES # BLD AUTO: 1.59 X10*3/UL (ref 0.1–1)
MONOCYTES NFR BLD AUTO: 11 %
NEUTROPHILS # BLD AUTO: 8.12 X10*3/UL (ref 1.2–7.7)
NEUTROPHILS NFR BLD AUTO: 55.9 %
NITRITE UR QL STRIP.AUTO: NEGATIVE
NRBC BLD-RTO: 0 /100 WBCS (ref 0–0)
PH UR STRIP.AUTO: 6.5 [PH]
PLATELET # BLD AUTO: 272 X10*3/UL (ref 150–450)
POTASSIUM SERPL-SCNC: 3.7 MMOL/L (ref 3.5–5.3)
PROT SERPL-MCNC: 6.8 G/DL (ref 6.4–8.2)
PROT UR STRIP.AUTO-MCNC: NEGATIVE MG/DL
RBC # BLD AUTO: 5.16 X10*6/UL (ref 4.5–5.9)
RBC # UR STRIP.AUTO: NEGATIVE MG/DL
SODIUM SERPL-SCNC: 141 MMOL/L (ref 136–145)
SP GR UR STRIP.AUTO: 1.03
UROBILINOGEN UR STRIP.AUTO-MCNC: NORMAL MG/DL
WBC # BLD AUTO: 14.5 X10*3/UL (ref 4.4–11.3)

## 2025-07-11 PROCEDURE — 93005 ELECTROCARDIOGRAM TRACING: CPT

## 2025-07-11 PROCEDURE — 99285 EMERGENCY DEPT VISIT HI MDM: CPT | Performed by: STUDENT IN AN ORGANIZED HEALTH CARE EDUCATION/TRAINING PROGRAM

## 2025-07-11 PROCEDURE — 99284 EMERGENCY DEPT VISIT MOD MDM: CPT | Performed by: STUDENT IN AN ORGANIZED HEALTH CARE EDUCATION/TRAINING PROGRAM

## 2025-07-11 PROCEDURE — 84484 ASSAY OF TROPONIN QUANT: CPT | Performed by: STUDENT IN AN ORGANIZED HEALTH CARE EDUCATION/TRAINING PROGRAM

## 2025-07-11 PROCEDURE — 83735 ASSAY OF MAGNESIUM: CPT | Performed by: STUDENT IN AN ORGANIZED HEALTH CARE EDUCATION/TRAINING PROGRAM

## 2025-07-11 PROCEDURE — 81003 URINALYSIS AUTO W/O SCOPE: CPT | Performed by: STUDENT IN AN ORGANIZED HEALTH CARE EDUCATION/TRAINING PROGRAM

## 2025-07-11 PROCEDURE — 80053 COMPREHEN METABOLIC PANEL: CPT | Performed by: STUDENT IN AN ORGANIZED HEALTH CARE EDUCATION/TRAINING PROGRAM

## 2025-07-11 PROCEDURE — 85025 COMPLETE CBC W/AUTO DIFF WBC: CPT | Performed by: STUDENT IN AN ORGANIZED HEALTH CARE EDUCATION/TRAINING PROGRAM

## 2025-07-11 PROCEDURE — 36415 COLL VENOUS BLD VENIPUNCTURE: CPT | Performed by: STUDENT IN AN ORGANIZED HEALTH CARE EDUCATION/TRAINING PROGRAM

## 2025-07-11 RX ORDER — OFLOXACIN 3 MG/ML
3 SOLUTION AURICULAR (OTIC) 2 TIMES DAILY
Qty: 0.21 ML | Refills: 0 | Status: SHIPPED | OUTPATIENT
Start: 2025-07-11 | End: 2025-07-12 | Stop reason: ENTERED-IN-ERROR

## 2025-07-11 ASSESSMENT — PAIN - FUNCTIONAL ASSESSMENT: PAIN_FUNCTIONAL_ASSESSMENT: 0-10

## 2025-07-11 ASSESSMENT — PAIN SCALES - GENERAL: PAINLEVEL_OUTOF10: 0 - NO PAIN

## 2025-07-12 ENCOUNTER — APPOINTMENT (OUTPATIENT)
Dept: RADIOLOGY | Facility: HOSPITAL | Age: 30
End: 2025-07-12
Payer: COMMERCIAL

## 2025-07-12 VITALS
SYSTOLIC BLOOD PRESSURE: 138 MMHG | OXYGEN SATURATION: 98 % | HEART RATE: 62 BPM | TEMPERATURE: 97.9 F | BODY MASS INDEX: 33.34 KG/M2 | HEIGHT: 68 IN | WEIGHT: 220 LBS | DIASTOLIC BLOOD PRESSURE: 76 MMHG | RESPIRATION RATE: 16 BRPM

## 2025-07-12 LAB
ATRIAL RATE: 67 BPM
P AXIS: 42 DEGREES
P OFFSET: 190 MS
P ONSET: 129 MS
PR INTERVAL: 180 MS
Q ONSET: 219 MS
QRS COUNT: 11 BEATS
QRS DURATION: 102 MS
QT INTERVAL: 374 MS
QTC CALCULATION(BAZETT): 395 MS
QTC FREDERICIA: 388 MS
R AXIS: 35 DEGREES
T AXIS: 36 DEGREES
T OFFSET: 406 MS
VENTRICULAR RATE: 67 BPM

## 2025-07-12 PROCEDURE — 71045 X-RAY EXAM CHEST 1 VIEW: CPT

## 2025-07-12 PROCEDURE — 71045 X-RAY EXAM CHEST 1 VIEW: CPT | Performed by: RADIOLOGY

## 2025-07-12 RX ORDER — CIPROFLOXACIN AND DEXAMETHASONE 3; 1 MG/ML; MG/ML
4 SUSPENSION/ DROPS AURICULAR (OTIC) 2 TIMES DAILY
Qty: 7.5 ML | Refills: 0 | Status: SHIPPED | OUTPATIENT
Start: 2025-07-12

## 2025-07-12 ASSESSMENT — PAIN - FUNCTIONAL ASSESSMENT: PAIN_FUNCTIONAL_ASSESSMENT: 0-10

## 2025-07-12 ASSESSMENT — PAIN SCALES - GENERAL: PAINLEVEL_OUTOF10: 0 - NO PAIN

## 2025-07-12 NOTE — ED PROVIDER NOTES
EMERGENCY DEPARTMENT ENCOUNTER      Pt Name: Dutch Richardson  MRN: [id number]  Birthdate 1995  Date of evaluation: 7/11/2025  Provider: Najma Beaulieu MD    CHIEF COMPLAINT       Chief Complaint   Patient presents with    Dizziness     Dizziness for about 3 weeks. Pt recently started prednisone and is not sure he is having a reaction to the ne medication. Pt states he has been seen and was told he was having vertigo or possible ear infection.          HISTORY OF PRESENT ILLNESS    A 30-year-old male with remote history of childhood ear infections and prior confusion from playing football couple years ago presents to the ED with ongoing dizziness for the past 3 weeks, he describes the dizziness as a sensation of lightheadedness and occasional imbalance, sometimes feeling as if he is being pulled to both sides.  He was evaluated by his PCP about a week ago and started on prednisone and meclizine for presumed vertigo or possible ear infection.  The patient reports side effects from meclizine and PCP recommended to discontinue medication.  He reports intermittent tingling in both arms and occasional blurry vision in the right eye.  He denies true limb weakness, nausea, vomiting, recent trauma or falls, rash, ear pain, gait disturbances, slurred speech, facial droop, or drug/alcohol use.       History provided by:  Patient   used: No        Nursing Notes were reviewed.    PAST MEDICAL HISTORY   Medical History[1]      SURGICAL HISTORY     Surgical History[2]      CURRENT MEDICATIONS       Previous Medications    PREDNISONE (DELTASONE) 10 MG TABLET    Take 60 mg a day for 6 days.  Then decrease by 10 mg a day until gone    ZOLPIDEM (AMBIEN) 5 MG TABLET    Take 1 tablet (5 mg) by mouth as needed at bedtime for sleep for up to 7 days.       ALLERGIES     Patient has no known allergies.    FAMILY HISTORY     Family History[3]       SOCIAL HISTORY     Social History[4]    SCREENINGS                         PHYSICAL EXAM    (up to 7 for level 4, 8 or more for level 5)     ED Triage Vitals [07/11/25 2138]   Temperature Heart Rate Respirations BP   36.6 °C (97.9 °F) 77 18 (!) 163/93      Pulse Ox Temp Source Heart Rate Source Patient Position   99 % Temporal Monitor Sitting      BP Location FiO2 (%)     Right arm --       Physical Exam  Vitals and nursing note reviewed.   Constitutional:       General: He is not in acute distress.     Appearance: Normal appearance. He is normal weight. He is not ill-appearing, toxic-appearing or diaphoretic.   HENT:      Head: Normocephalic and atraumatic.      Right Ear: Hearing, ear canal and external ear normal. No decreased hearing noted. No drainage, swelling or tenderness. Tympanic membrane is perforated.      Left Ear: Hearing, ear canal and external ear normal. No decreased hearing noted. No drainage, swelling or tenderness. Tympanic membrane is perforated.      Nose: Nose normal.   Eyes:      General: No visual field deficit.     Extraocular Movements: Extraocular movements intact.      Pupils: Pupils are equal, round, and reactive to light.   Cardiovascular:      Rate and Rhythm: Normal rate and regular rhythm.      Pulses: Normal pulses.      Heart sounds: Normal heart sounds.   Pulmonary:      Effort: Pulmonary effort is normal. No respiratory distress.      Breath sounds: Normal breath sounds.   Abdominal:      General: Abdomen is flat. There is no distension.      Palpations: Abdomen is soft. There is no mass.      Tenderness: There is no abdominal tenderness. There is no guarding or rebound.   Musculoskeletal:         General: No swelling, tenderness or deformity. Normal range of motion.      Cervical back: Normal range of motion and neck supple. No rigidity or tenderness.      Right lower leg: No edema.      Left lower leg: No edema.   Skin:     General: Skin is warm and dry.      Capillary Refill: Capillary refill takes less than 2 seconds.      Coloration: Skin  is not pale.      Findings: No erythema or rash.   Neurological:      General: No focal deficit present.      Mental Status: He is alert and oriented to person, place, and time.      GCS: GCS eye subscore is 4. GCS verbal subscore is 5. GCS motor subscore is 6.      Cranial Nerves: Cranial nerves 2-12 are intact. No cranial nerve deficit, dysarthria or facial asymmetry.      Sensory: No sensory deficit.      Motor: No weakness, tremor, atrophy, abnormal muscle tone, seizure activity or pronator drift.      Coordination: Coordination is intact. Romberg sign negative. Coordination normal. Finger-Nose-Finger Test normal.      Gait: Gait normal.      Comments: Strength 5 out of 5 in the upper and lower extremities.   Psychiatric:         Mood and Affect: Mood normal.         Behavior: Behavior normal.          DIAGNOSTIC RESULTS     LABS:  Labs Reviewed   CBC WITH AUTO DIFFERENTIAL - Abnormal       Result Value    WBC 14.5 (*)     nRBC 0.0      RBC 5.16      Hemoglobin 15.3      Hematocrit 45.1      MCV 87      MCH 29.7      MCHC 33.9      RDW 12.6      Platelets 272      Neutrophils % 55.9      Immature Granulocytes %, Automated 0.7      Lymphocytes % 30.7      Monocytes % 11.0      Eosinophils % 1.2      Basophils % 0.5      Neutrophils Absolute 8.12 (*)     Immature Granulocytes Absolute, Automated 0.10      Lymphocytes Absolute 4.45      Monocytes Absolute 1.59 (*)     Eosinophils Absolute 0.18      Basophils Absolute 0.07     COMPREHENSIVE METABOLIC PANEL - Normal    Glucose 87      Sodium 141      Potassium 3.7      Chloride 106      Bicarbonate 26      Anion Gap 13      Urea Nitrogen 21      Creatinine 0.95      eGFR >90      Calcium 8.9      Albumin 4.1      Alkaline Phosphatase 49      Total Protein 6.8      AST 16      Bilirubin, Total 0.6      ALT 23     MAGNESIUM - Normal    Magnesium 1.95     URINALYSIS WITH REFLEX CULTURE AND MICROSCOPIC - Normal    Color, Urine Yellow      Appearance, Urine Clear       Specific Gravity, Urine 1.027      pH, Urine 6.5      Protein, Urine NEGATIVE      Glucose, Urine Normal      Blood, Urine NEGATIVE      Ketones, Urine NEGATIVE      Bilirubin, Urine NEGATIVE      Urobilinogen, Urine Normal      Nitrite, Urine NEGATIVE      Leukocyte Esterase, Urine NEGATIVE     TROPONIN I, HIGH SENSITIVITY - Normal    Troponin I, High Sensitivity <3      Narrative:     Less than 99th percentile of normal range cutoff-  Female and children under 18 years old <14 ng/L; Male <21 ng/L: Negative  Repeat testing should be performed if clinically indicated.     Female and children under 18 years old 14-50 ng/L; Male 21-50 ng/L:  Consistent with possible cardiac damage and possible increased clinical   risk. Serial measurements may help to assess extent of myocardial damage.     >50 ng/L: Consistent with cardiac damage, increased clinical risk and  myocardial infarction. Serial measurements may help assess extent of   myocardial damage.      NOTE: Children less than 1 year old may have higher baseline troponin   levels and results should be interpreted in conjunction with the overall   clinical context.     NOTE: Troponin I testing is performed using a different   testing methodology at [facility] than at other   [facility]. Direct result comparisons should only   be made within the same method.   URINALYSIS WITH REFLEX CULTURE AND MICROSCOPIC    Narrative:     The following orders were created for panel order Urinalysis with Reflex Culture and Microscopic.  Procedure                               Abnormality         Status                     ---------                               -----------         ------                     Urinalysis with Reflex C...[[id number]]  Normal              Final result               Extra Urine Gray Tube[[id number]]                            In process                   Please view results for these tests on the individual orders.   EXTRA URINE GRAY TUBE        All other labs were within normal range or not returned as of this dictation.    Imaging  XR chest 1 view    (Results Pending)        Procedures  Procedures     EMERGENCY DEPARTMENT COURSE/MDM:     Diagnoses as of 07/12/25 0027   Lightheadedness   Rupture of both tympanic membranes   Dizziness        Medical Decision Making    A 30-year-old male presents to the ED for 3-weeks of dizziness described as lightheadedness and imbalance.  The patient is hemodynamically stable.  Examination only remarkable for chronic membrane ruptures.  No evidence of acute neurological deficits on exam.  No weakness, no pronator drift and no abnormal gait.  Negative Romberg test.  Basic lab ordered. It was remarkable for elevated WBC, most likely reactive leukocytosis given recent steroid treatment.  There is no concern for central cause of dizziness.  The presentation is most consistent with peripheral etiology, such as vestibular dysfunction, postviral vertigo or BPPV.  CT head is not indicated at this time.  The patient was discharged home in stable condition with return precautions, ENT referral and Ciprodex prescription.    Patient and or family in agreement and understanding of treatment plan.  All questions answered.      I reviewed the case with the attending ED physician. The attending ED physician agrees with the plan. Patient and/or patient´s representative was counseled regarding labs, imaging, likely diagnosis, and plan. All questions were answered.    ED Medications administered this visit:  Medications - No data to display    New Prescriptions from this visit:    New Prescriptions    CIPROFLOXACIN-DEXAMETHASONE (CIPRODEX) OTIC SUSPENSION    Administer 4 drops into each ear 2 times a day.       Follow-up:  Oscar Marroquin MD  [address] [facility] [address] [phone]    In 3 days      Alvin Gregory DO  [address]  [facility] [address]  [phone]    Go in 3 days           Final Impression:   1. Lightheadedness    2. Rupture of both tympanic membranes    3. Dizziness          (Please note that portions of this note were completed with a voice recognition program.  Efforts were made to edit the dictations but occasionally words are mis-transcribed.)       [1]   Past Medical History:  Diagnosis Date    Allergic    [2]   Past Surgical History:  Procedure Laterality Date    ADENOIDECTOMY      HERNIA REPAIR     [3]   Family History  Problem Relation Name Age of Onset    Breast cancer Mother Mandi Richardson     Cancer Mother Mandi Richardson    [4]   Social History  Socioeconomic History    Marital status:    Tobacco Use    Smoking status: Never     Passive exposure: Never    Smokeless tobacco: Former     Types: Snuff     Quit date: 7/2/2020   Vaping Use    Vaping status: Never Used   Substance and Sexual Activity    Alcohol use: Never    Drug use: Never    Sexual activity: Yes     Partners: Female     Birth control/protection: I.U.D., Condom Male     Comment: Wife     Social Drivers of Health     Financial Resource Strain: Low Risk  (12/15/2023)    Received from [employer]    Overall Financial Resource Strain (CARDIA)     Difficulty of Paying Living Expenses: Not very hard   Food Insecurity: No Food Insecurity (12/15/2023)    Received from [facility]    Hunger Vital Sign     Worried About Running Out of Food in the Last Year: Never true     Ran Out of Food in the Last Year: Never true   Transportation Needs: No Transportation Needs (12/15/2023)    Received from [facility]    PRAPARE - Transportation     Lack of Transportation (Medical): No     Lack of Transportation (Non-Medical): No   Physical Activity: Sufficiently Active (12/15/2023)    Received from [employer]    Exercise Vital Sign     Days of Exercise per Week: 7 days     Minutes of Exercise per Session: 60 min   Stress: No Stress Concern Present (12/15/2023)    Received from [employer] [place] [facility] [employer] - Occupational Stress Questionnaire     Feeling of Stress : Not at all   Social Connections: Moderately Isolated (12/15/2023)    Received from [employer]    Social Connection and Isolation Panel [NHANES]     Frequency of Communication with Friends and Family: Twice a week     Frequency of Social Gatherings with Friends and Family: Once a week     Attends [employer] Services: Never     Active Member of Clubs or Organizations: No     Attends Club or Organization Meetings: Never     Marital Status:    Intimate Partner Violence: Not At Risk (12/15/2023)    Received from [employer]    Humiliation, Afraid, Rape, and Kick questionnaire     Fear of Current or Ex-Partner: No     Emotionally Abused: No     Physically Abused: No     Sexually Abused: No   Housing Stability: Low Risk  (12/15/2023)    Received from [employer]    Housing Stability Vital Sign     Unable to Pay for Housing in the Last Year: No     Number of Places Lived in the Last Year: 1     Unstable Housing in the Last Year: No        Najma Beaulieu MD  Resident  07/12/25 0027

## 2025-07-12 NOTE — DISCHARGE INSTRUCTIONS
You were evaluated in the ED for dizziness.  Labs are reassuring and you are stable to go home.  Please, follow-up with ENT and apply eardrops as indicated.  Seek medical attention if you have:  Worsening dizziness  Drainage from your ear  Ear pain  Loss of hearing  Have a foreign object in your ear that does not come out  You are not feeling better in 2 to 3 days or you are feeling worse  Vision changes  Trouble speaking or walking  Facial droop  New rash  Neck pain or stiffness  New/worsening symptoms or any other concern

## 2025-07-14 LAB — HOLD SPECIMEN: NORMAL

## 2025-07-22 ENCOUNTER — APPOINTMENT (OUTPATIENT)
Facility: CLINIC | Age: 30
End: 2025-07-22
Payer: COMMERCIAL

## 2025-07-22 VITALS
HEIGHT: 68 IN | DIASTOLIC BLOOD PRESSURE: 81 MMHG | SYSTOLIC BLOOD PRESSURE: 136 MMHG | BODY MASS INDEX: 33.34 KG/M2 | WEIGHT: 220 LBS | TEMPERATURE: 97.6 F

## 2025-07-22 DIAGNOSIS — H74.03 TYMPANOSCLEROSIS OF BOTH EARS: ICD-10-CM

## 2025-07-22 DIAGNOSIS — R42 LIGHTHEADEDNESS: Primary | ICD-10-CM

## 2025-07-22 DIAGNOSIS — R42 DIZZINESS AND GIDDINESS: ICD-10-CM

## 2025-07-22 PROCEDURE — 99214 OFFICE O/P EST MOD 30 MIN: CPT | Performed by: OTOLARYNGOLOGY

## 2025-07-22 PROCEDURE — 3008F BODY MASS INDEX DOCD: CPT | Performed by: OTOLARYNGOLOGY

## 2025-07-22 NOTE — PROGRESS NOTES
Impression:  1. Lightheadedness        2. Dizziness and giddiness        3. Tympanosclerosis of both ears             RECOMMENDATIONS/PLAN :  I reassured the patient there is no evidence of any persistent tympanic membrane perforation.  He does have significant tympanosclerosis from when he was a child and had PE tubes.  Both tympanic membranes are intact.  I would like to refer him to [facility] for complete workup as to what is causing his lightheadedness/unsteadiness.  I explained to the patient that it does not sound like he has been dealing with true spinning/vertigo and therefore this is probably not his inner ear.      **This electronic medical record note was created with the use of voice recognition software.  Despite proofreading, typographical or grammatical errors may be present that could affect meaning of content **    Subjective   Patient ID:     Dutch Richardson is a 30 y.o. male who presents to the office today after he recently had unsteadiness with lightheadedness as well as some tingling into his hands and feet.  He was seen through the emergency department and he was told that he has bilateral tympanic membrane perforations.  He was given meclizine however this just made him tired.  Over the last several days he has been feeling better.  His blood pressure was elevated in the emergency department.  He denies any persistent true spinning.  He denies any new medications.  No recent upper respiratory issues.    ROS:  A detailed 12 system review of systems is noted on the intake form has been reviewed with the patient with details noted in the HPI and scanned into the patient's medical record.    Objective     Medical History[1]     Surgical History[2]     RX Allergies[3]     Current Medications[4]     Tobacco Use: Medium Risk (7/22/2025)    Patient History     Smoking Tobacco Use: Never     Smokeless Tobacco Use: Former     Passive Exposure: Never        Alcohol Use: Unknown (2/10/2020)    [facility]    AUDIT-C     Q1: How often do you have a drink containing alcohol?: Monthly or less     Average Number of Drinks: Not on file     Frequency of Binge Drinking: Not on file        Social History     Substance and Sexual Activity   Drug Use Never        Physical Exam:  Visit Vitals  /81   Temp 36.4 °C (97.6 °F) (Temporal)   Ht 1.727 m (5' 8\")   Wt 99.8 kg (220 lb)   BMI 33.45 kg/m²   Smoking Status Never   BSA 2.19 m²      General: Patient is alert, oriented, cooperative in no apparent distress.  Head: Normocephalic, atraumatic.  Eyes: PERRL, EOMI, Conjunctiva is clear. No nystagmus.  Ears: Right Ear-- Pinna is normal.  External auditory canal is patent. Tympanic membrane is intact with significant tympanosclerosis.  It appears he had a previous perforation where his PE tube was however this has healed and his tympanic membrane is intact.  No perforation..  Mastoid is nontender.  Left ear-- Pinna is normal.  External auditory canal is patent. Tympanic membrane is intact with significant tympanosclerosis.  No effusion.  No evidence of perforation..  Mastoid is nontender.  Nose: Septum is straight .  No septal perforation or lesions. No septal hematoma/ seroma.  No signs of bleeding.  Inferior turbinates are normal.   No evidence of intranasal polyps.  No infectious drainage.  Throat:  Floor of mouth is clear, no masses.  Tongue appears normal, no lesions or masses. Gums, gingiva, buccal mucosa appear pink and moist, no lesions. Teeth are in good repair.  No obvious dental infections.  Peritonsillar regions appear symmetric without swelling.  Hard and soft palate appear normal, no obvious cleft. Uvula is midline.  Oropharynx: No lesions. Retropharyngeal wall is flat.  No active postnasal drip.  Neck: Supple,  no lymphadenopathy.  No masses.  Salivary Glands: Symmetric bilaterally.  No palpable masses.  No evidence of acute infection or salivary stones  Neurologic: Cranial Nerves 2-12 are grossly " intact without focal deficits. Cerebellar function testing is normal.     Results:   []    Procedure:   []    Alvin Gregory DO        [1]   Past Medical History:  Diagnosis Date    Allergic     Dizziness 4 weeks ago    Ear problems     Fatigue     Tinnitus    [2]   Past Surgical History:  Procedure Laterality Date    ADENOIDECTOMY      HERNIA REPAIR      TYMPANOSTOMY TUBE PLACEMENT     [3] No Known Allergies  [4]   Current Outpatient Medications:     ciprofloxacin-dexamethasone (Ciprodex) otic suspension, Administer 4 drops into each ear 2 times a day. (Patient not taking: Reported on 7/22/2025), Disp: 7.5 mL, Rfl: 0    predniSONE (Deltasone) 10 mg tablet, Take 60 mg a day for 6 days.  Then decrease by 10 mg a day until gone, Disp: 51 tablet, Rfl: 0    zolpidem (Ambien) 5 mg tablet, Take 1 tablet (5 mg) by mouth as needed at bedtime for sleep for up to 7 days., Disp: 7 tablet, Rfl: 0
